# Patient Record
Sex: MALE | Race: WHITE | NOT HISPANIC OR LATINO | Employment: OTHER | URBAN - METROPOLITAN AREA
[De-identification: names, ages, dates, MRNs, and addresses within clinical notes are randomized per-mention and may not be internally consistent; named-entity substitution may affect disease eponyms.]

---

## 2017-01-09 ENCOUNTER — OFFICE VISIT (OUTPATIENT)
Dept: CARDIOLOGY | Facility: CLINIC | Age: 67
End: 2017-01-09

## 2017-01-09 VITALS
WEIGHT: 171.6 LBS | HEIGHT: 71 IN | HEART RATE: 63 BPM | DIASTOLIC BLOOD PRESSURE: 70 MMHG | SYSTOLIC BLOOD PRESSURE: 122 MMHG | BODY MASS INDEX: 24.02 KG/M2

## 2017-01-09 DIAGNOSIS — I21.11 ST ELEVATION MYOCARDIAL INFARCTION INVOLVING RIGHT CORONARY ARTERY (HCC): Primary | ICD-10-CM

## 2017-01-09 DIAGNOSIS — I25.10 CORONARY ARTERY DISEASE INVOLVING NATIVE CORONARY ARTERY OF NATIVE HEART WITHOUT ANGINA PECTORIS: ICD-10-CM

## 2017-01-09 PROBLEM — I21.3 ST ELEVATION MYOCARDIAL INFARCTION (STEMI): Status: ACTIVE | Noted: 2017-01-09

## 2017-01-09 PROCEDURE — 93000 ELECTROCARDIOGRAM COMPLETE: CPT | Performed by: INTERNAL MEDICINE

## 2017-01-09 PROCEDURE — 99213 OFFICE O/P EST LOW 20 MIN: CPT | Performed by: INTERNAL MEDICINE

## 2017-01-09 RX ORDER — SIMVASTATIN 40 MG
40 TABLET ORAL NIGHTLY
Qty: 90 TABLET | Refills: 3 | Status: SHIPPED | OUTPATIENT
Start: 2017-01-09 | End: 2020-03-31

## 2017-01-09 NOTE — PROGRESS NOTES
Date of Office Visit: 2017  Encounter Provider: Taras Perrin MD  Place of Service: [facility] CARDIOLOGY  Patient Name: Twin Babcock  :1950  [id number]    Chief Complaint   Patient presents with   • Coronary Artery Disease   :     HPI: Twin Babcock is a 66 y.o. male   He is here for followup of his coronary disease.  He initially had an inferior STEMI, now almost 2 years ago.  He came in by helicopter from [facility].  At that time we placed a 3.5 x 23 Xience in his proximal RCA.  He has done very well since then.  His LAD and circumflex were free of disease and he had overall preserved LV function.  He does not smoke.  He does not have diabetes or hypertension.  In general, he is not having any complaints of chest discomfort or shortness of breath.  His weight is down about 10 pounds on purpose.          Past Medical History   Diagnosis Date   • Health care maintenance    • ST elevation myocardial infarction (STEMI)        Past Surgical History   Procedure Laterality Date   • Cardiac catheterization     • Coronary stent placement         Social History     Social History   • Marital status:      Spouse name: N/A   • Number of children: N/A   • Years of education: N/A     Occupational History   • Not on file.     Social History Main Topics   • Smoking status: Former Smoker   • Smokeless tobacco: Not on file      Comment: caffeine use   • Alcohol use Yes   • Drug use: No   • Sexual activity: Not on file     Other Topics Concern   • Not on file     Social History Narrative       No family history on file.    Review of Systems   Constitution: Negative for decreased appetite, fever, malaise/fatigue and weight loss.   HENT: Negative for nosebleeds.    Eyes: Negative for double vision.   Cardiovascular: Negative for chest pain, claudication, cyanosis, dyspnea on exertion, irregular heartbeat, leg swelling, near-syncope, orthopnea, palpitations, paroxysmal  "nocturnal dyspnea and syncope.   Respiratory: Negative for cough, hemoptysis and shortness of breath.    Hematologic/Lymphatic: Negative for bleeding problem.   Skin: Negative for rash.   Musculoskeletal: Negative for falls and myalgias.   Gastrointestinal: Negative for hematochezia, jaundice, melena, nausea and vomiting.   Genitourinary: Negative for hematuria.   Neurological: Negative for dizziness and seizures.   Psychiatric/Behavioral: Negative for altered mental status and memory loss.       Allergies   Allergen Reactions   • Metaxalone          Current Outpatient Prescriptions:   •  aspirin 81 MG tablet, Take  by mouth Daily., Disp: , Rfl:   •  simvastatin (ZOCOR) 40 MG tablet, Take 1 tablet by mouth Every Night., Disp: 90 tablet, Rfl: 3     Objective:     Vitals:    01/09/17 1040   BP: 122/70   Pulse: 63   Weight: 171 lb 9.6 oz (77.8 kg)   Height: 71\" (180.3 cm)     Body mass index is 23.93 kg/(m^2).    Physical Exam   Constitutional: He is oriented to person, place, and time. He appears well-developed and well-nourished.   HENT:   Head: Normocephalic.   Eyes: No scleral icterus.   Neck: No JVD present. No thyromegaly present.   Cardiovascular: Normal rate, regular rhythm and normal heart sounds.  Exam reveals no gallop and no friction rub.    No murmur heard.  Pulmonary/Chest: Effort normal and breath sounds normal. He has no wheezes. He has no rales.   Abdominal: Soft. There is no hepatosplenomegaly. There is no tenderness.   Musculoskeletal: Normal range of motion. He exhibits no edema.   Lymphadenopathy:     He has no cervical adenopathy.   Neurological: He is alert and oriented to person, place, and time.   Skin: Skin is warm and dry. No rash noted.   Psychiatric: He has a normal mood and affect.         ECG 12 Lead  Date/Time: 1/9/2017 11:02 AM  Performed by: SAMMI PATTERSON  Authorized by: SAMMI PATTERSON   Comparison: compared with previous ECG   Similar to previous ECG  Rhythm: sinus " bradycardia  Clinical impression: normal ECG             Assessment:       Diagnosis Plan   1. ST elevation myocardial infarction involving right coronary artery     2. Coronary artery disease involving native coronary artery of native heart without angina pectoris            Plan:        He is doing extremely well and is asymptomatic after a STEMI.  He is on a statin and aspirin.  He has no history of hypertension, tobacco abuse, or diabetes.  His weight is good.  His activity level is good.  If no change, we will see him in a year.      Coronary Artery Disease  Assessment  • The patient has no angina    Plan  • Lifestyle modifications discussed include adhering to a heart healthy diet, maintenance of a healthy weight, medication compliance, regular exercise and regular monitoring of cholesterol and blood pressure    Subjective - Objective  • There is a history of past MI 2/15  • There has been a previous stent procedure using RIKKI 2/15  • Current antiplatelet therapy includes aspirin 81 mg        As always, it has been a pleasure to participate in your patient's care.      Sincerely,       Taras Perrin MD

## 2018-01-15 ENCOUNTER — OFFICE VISIT (OUTPATIENT)
Dept: CARDIOLOGY | Facility: CLINIC | Age: 68
End: 2018-01-15

## 2018-01-15 VITALS
HEART RATE: 59 BPM | WEIGHT: 181.4 LBS | BODY MASS INDEX: 25.97 KG/M2 | SYSTOLIC BLOOD PRESSURE: 140 MMHG | HEIGHT: 70 IN | DIASTOLIC BLOOD PRESSURE: 78 MMHG

## 2018-01-15 DIAGNOSIS — R25.1 TREMOR OF RIGHT HAND: ICD-10-CM

## 2018-01-15 DIAGNOSIS — R03.0 TRANSIENT ELEVATED BLOOD PRESSURE: ICD-10-CM

## 2018-01-15 DIAGNOSIS — I21.11 ST ELEVATION MYOCARDIAL INFARCTION INVOLVING RIGHT CORONARY ARTERY (HCC): ICD-10-CM

## 2018-01-15 DIAGNOSIS — E78.00 HYPERCHOLESTEROLEMIA: ICD-10-CM

## 2018-01-15 DIAGNOSIS — I25.10 CORONARY ARTERY DISEASE INVOLVING NATIVE CORONARY ARTERY OF NATIVE HEART WITHOUT ANGINA PECTORIS: Primary | ICD-10-CM

## 2018-01-15 PROCEDURE — 93000 ELECTROCARDIOGRAM COMPLETE: CPT | Performed by: NURSE PRACTITIONER

## 2018-01-15 PROCEDURE — 99213 OFFICE O/P EST LOW 20 MIN: CPT | Performed by: NURSE PRACTITIONER

## 2018-01-15 NOTE — PROGRESS NOTES
Date of Office Visit: 01/15/2018  Encounter Provider: MARIANO Chinchilla  Place of Service: [facility] CARDIOLOGY  Patient Name: Twin Babcock  :1950    Chief Complaint   Patient presents with   • Coronary Artery Disease   :     HPI: Twin Babcock is a 67 y.o. male who presents today for annual cardiac follow-up.  He is a new patient to me and his previous records have been reviewed.  He has a history of coronary artery disease, inferior STEMI, and status post 3.5×23 mm Xience stent and proximal RCA.  His LAD and circumflex are free of disease and he was noted to have overall preserved LV function.  He does not have any history of diabetes or hypertension.  He remains on simvastatin for history of hypercholesterolemia, followed by his PCP.  He said he was started on fish oil on his last appointment with his family doctor.  He remains active.  He only experiences shortness of breath when he overexerts.  He denies chest pain, paroxysmal nocturnal dyspnea, orthopnea, cough, edema, palpitations, dizziness, or syncope.  He is developed in essential tremor of the right hand.    The following portion of the patient's history were reviewed and updated as appropriate: past medical history, past surgical history, past social history, past family history, allergies, current medications, and problem list.    Past Medical History:   Diagnosis Date   • Coronary artery disease    • Essential tremor     right hand    • Hypercholesteremia    • ST elevation myocardial infarction (STEMI)        Past Surgical History:   Procedure Laterality Date   • CARDIAC CATHETERIZATION     • CORONARY STENT PLACEMENT      3.5 x 23 Xience in his proximal RCA   • EYE SURGERY     • TONSILLECTOMY         Social History     Social History   • Marital status:      Spouse name: N/A   • Number of children: N/A   • Years of education: N/A     Occupational History   • Not on file.     Social History  Main Topics   • Smoking status: Former Smoker   • Smokeless tobacco: Never Used      Comment: Daily caffeine use   • Alcohol use Yes      Comment: occ   • Drug use: No   • Sexual activity: Not on file       History reviewed. No pertinent family history.    Review of Systems   Constitution: Negative for chills, diaphoresis, fever, malaise/fatigue, night sweats, weight gain and weight loss.   HENT: Positive for hearing loss. Negative for nosebleeds, sore throat and tinnitus.    Eyes: Negative for blurred vision, double vision, pain and visual disturbance.   Cardiovascular: Positive for dyspnea on exertion. Negative for chest pain, claudication, cyanosis, irregular heartbeat, leg swelling, near-syncope, orthopnea, palpitations, paroxysmal nocturnal dyspnea and syncope.   Respiratory: Negative for cough, hemoptysis, shortness of breath, snoring and wheezing.    Endocrine: Negative for cold intolerance, heat intolerance and polyuria.   Hematologic/Lymphatic: Negative for bleeding problem. Does not bruise/bleed easily.   Skin: Negative for color change, dry skin, flushing and itching.   Musculoskeletal: Positive for joint pain. Negative for falls, joint swelling, muscle cramps, muscle weakness and myalgias.   Gastrointestinal: Negative for abdominal pain, constipation, heartburn, melena, nausea and vomiting.   Genitourinary: Negative for dysuria and hematuria.   Neurological: Negative for excessive daytime sleepiness, dizziness, light-headedness, loss of balance, numbness, paresthesias, seizures and vertigo.   Psychiatric/Behavioral: Negative for altered mental status, depression, memory loss and substance abuse. The patient does not have insomnia and is not nervous/anxious.    Allergic/Immunologic: Negative for environmental allergies.       Allergies   Allergen Reactions   • Metaxalone          Current Outpatient Prescriptions:   •  aspirin 81 MG tablet, Take  by mouth Daily., Disp: , Rfl:   •  Omega-3 Fatty Acids (RA  "FISH OIL) 1000 MG capsule delayed-release, Take 2,000 Units by mouth 2 (Two) Times a Day., Disp: , Rfl: 0  •  simvastatin (ZOCOR) 40 MG tablet, Take 1 tablet by mouth Every Night., Disp: 90 tablet, Rfl: 3     Objective:     Vitals:    01/15/18 1313   BP: 140/78   Pulse: 59   Weight: 82.3 kg (181 lb 6.4 oz)   Height: 177.8 cm (70\")     Body mass index is 26.03 kg/(m^2).    PHYSICAL EXAM:    Vitals Reviewed.   General Appearance: No acute distress, well developed and well nourished.   Eyes: Conjunctiva and lids: No erythema, swelling, or discharge. Sclera non-icteric.   HENT: Atraumatic, normocephalic. External eyes, ears, and nose normal. No hearing loss noted. Mucous membranes normal. Lips not cyanotic. Neck supple with no tenderness.  Respiratory: No signs of respiratory distress. Respiration rhythm and depth normal.   Clear to auscultation. No rales, crackles, rhonchi, or wheezing auscultated.   Cardiovascular:  Jugular Venous Pressure: Normal  Heart Rate and Rhythm: Normal, Heart Sounds: Normal S1 and S2. No S3 or S4 noted.  Murmurs: No murmurs noted. No rubs, thrills, or gallops.   Arterial Pulses: Carotid pulses normal. No carotid bruit noted. Posterior tibialis and dorsalis pedis pulses normal.   Lower Extremities: No edema noted.  Gastrointestinal:  Abdomen soft, non-distended, non-tender. Normal bowel sounds. No hepatomegaly.   Musculoskeletal: Normal movement of extremities  Skin and Nails: General appearance normal. No pallor, cyanosis, diaphoresis. Skin temperature normal. No clubbing of fingernails.   Psychiatric: Patient alert and oriented to person, place, and time. Speech and behavior appropriate. Normal mood and affect.       ECG 12 Lead  Date/Time: 1/15/2018 1:22 PM  Performed by: KAYLYNN MILNER  Authorized by: KAYLYNN MILNER   Comparison: compared with previous ECG from 1/9/2017  Similar to previous ECG  Rhythm: sinus bradycardia  Rate: bradycardic  BPM: 59  Conduction: conduction normal  ST " Segments: ST segments normal  T Waves: T waves normal  QRS axis: normal  Other: no other findings  Clinical impression: normal ECG              Assessment:       Diagnosis Plan   1. Coronary artery disease involving native coronary artery of native heart without angina pectoris     2. ST elevation myocardial infarction involving right coronary artery     3. Hypercholesterolemia     4. Transient elevated blood pressure     5. Tremor of right hand            Plan:       1. Coronary Artery Disease  Assessment  • The patient has no angina    Plan  • Lifestyle modifications discussed include adhering to a heart healthy diet, avoidance of tobacco products, maintenance of a healthy weight, medication compliance, regular exercise and regular monitoring of cholesterol and blood pressure    Subjective - Objective  • There is a history of past MI  • There has been a previous stent procedure using RIKKI  • Current antiplatelet therapy includes aspirin 81 mg      2. Hypercholesterolemia: This is managed by his PCP.  LDL goal is less than 70.  He was placed on fish oil by his PCP.  I told him that Dr. Perrin typically does not believe and the benefits of fish oil. When he receives his next lipid panel results, of asked him to call our office with the results.     3.  Transient Elevated Blood Pressure: His blood pressure is borderline elevated today.  He states his blood pressures typically well controlled.    4.  Right Hand Tremor: He was diagnosed with essential tremor of the right hand that occurs mostly when eating.    5.  Follow Up: He will call with any concerning cardiac symptoms and follow-up with Dr. Perrin in one year.    As always, it has been a pleasure to participate in your patient's care.      Sincerely,         MARIANO Barrera

## 2018-01-31 ENCOUNTER — OFFICE VISIT CONVERTED (OUTPATIENT)
Dept: FAMILY MEDICINE CLINIC | Facility: CLINIC | Age: 68
End: 2018-01-31
Attending: NURSE PRACTITIONER

## 2018-01-31 ENCOUNTER — CONVERSION ENCOUNTER (OUTPATIENT)
Dept: FAMILY MEDICINE CLINIC | Facility: CLINIC | Age: 68
End: 2018-01-31

## 2018-02-26 ENCOUNTER — OFFICE VISIT CONVERTED (OUTPATIENT)
Dept: FAMILY MEDICINE CLINIC | Facility: CLINIC | Age: 68
End: 2018-02-26
Attending: NURSE PRACTITIONER

## 2018-03-02 ENCOUNTER — OFFICE VISIT CONVERTED (OUTPATIENT)
Dept: ORTHOPEDIC SURGERY | Facility: CLINIC | Age: 68
End: 2018-03-02
Attending: ORTHOPAEDIC SURGERY

## 2018-03-15 ENCOUNTER — OFFICE VISIT CONVERTED (OUTPATIENT)
Dept: FAMILY MEDICINE CLINIC | Facility: CLINIC | Age: 68
End: 2018-03-15
Attending: NURSE PRACTITIONER

## 2018-04-13 ENCOUNTER — OFFICE VISIT CONVERTED (OUTPATIENT)
Dept: ORTHOPEDIC SURGERY | Facility: CLINIC | Age: 68
End: 2018-04-13
Attending: ORTHOPAEDIC SURGERY

## 2018-04-13 ENCOUNTER — TELEPHONE (OUTPATIENT)
Dept: CARDIOLOGY | Facility: CLINIC | Age: 68
End: 2018-04-13

## 2018-04-13 NOTE — TELEPHONE ENCOUNTER
Dr. Brittani Koch's office calling wanting to know if its ok to do a MRI of Rt. Shoulder on Mr. Babcock    Sarah [phone]

## 2018-05-04 ENCOUNTER — OFFICE VISIT CONVERTED (OUTPATIENT)
Dept: ORTHOPEDIC SURGERY | Facility: CLINIC | Age: 68
End: 2018-05-04
Attending: ORTHOPAEDIC SURGERY

## 2018-07-19 ENCOUNTER — OFFICE VISIT CONVERTED (OUTPATIENT)
Dept: FAMILY MEDICINE CLINIC | Facility: CLINIC | Age: 68
End: 2018-07-19
Attending: NURSE PRACTITIONER

## 2018-08-09 ENCOUNTER — OFFICE VISIT CONVERTED (OUTPATIENT)
Dept: ORTHOPEDIC SURGERY | Facility: CLINIC | Age: 68
End: 2018-08-09
Attending: ORTHOPAEDIC SURGERY

## 2018-12-07 ENCOUNTER — OFFICE VISIT CONVERTED (OUTPATIENT)
Dept: FAMILY MEDICINE CLINIC | Facility: CLINIC | Age: 68
End: 2018-12-07
Attending: NURSE PRACTITIONER

## 2018-12-07 ENCOUNTER — CONVERSION ENCOUNTER (OUTPATIENT)
Dept: FAMILY MEDICINE CLINIC | Facility: CLINIC | Age: 68
End: 2018-12-07

## 2019-02-07 ENCOUNTER — OFFICE VISIT (OUTPATIENT)
Dept: CARDIOLOGY | Facility: CLINIC | Age: 69
End: 2019-02-07

## 2019-02-07 VITALS
DIASTOLIC BLOOD PRESSURE: 64 MMHG | SYSTOLIC BLOOD PRESSURE: 114 MMHG | HEART RATE: 60 BPM | BODY MASS INDEX: 25.77 KG/M2 | WEIGHT: 180 LBS | HEIGHT: 70 IN

## 2019-02-07 DIAGNOSIS — I25.10 CORONARY ARTERY DISEASE INVOLVING NATIVE CORONARY ARTERY OF NATIVE HEART WITHOUT ANGINA PECTORIS: Primary | ICD-10-CM

## 2019-02-07 PROCEDURE — 99213 OFFICE O/P EST LOW 20 MIN: CPT | Performed by: NURSE PRACTITIONER

## 2019-02-07 PROCEDURE — 93000 ELECTROCARDIOGRAM COMPLETE: CPT | Performed by: NURSE PRACTITIONER

## 2019-02-07 NOTE — PROGRESS NOTES
Date of Office Visit: 2019  Encounter Provider: MARIANO Mclaughlin  Place of Service: [facility] CARDIOLOGY  Patient Name: Twin Babcock  :1950    Chief Complaint   Patient presents with   • Coronary Artery Disease     1 YEAR F/U   • STEMI   :     HPI: Twin Babcock is a 68 y.o. male, new to me, who presents today for follow-up.  Old records have been obtained and reviewed by me.  He is a patient of Dr. Tovar with a past cardiac history significant for coronary artery disease and hypercholesteremia.  In , he had an inferior STEMI with a stent to the proximal RCA.  His LAD and circumflex were free of disease and he was noted to have overall preserved LV function.  He is on simvastatin for a history of hypercholesterolemia which is managed by his PCP.  He does not have a history of hypertension or diabetes and he has never been a smoker.  He was last seen in the office on 1/15/2018 and at that time he was doing well with no complaints of angina or heart failure.   Overall he is doing well from a cardiac standpoint.  He denies chest pain, shortness of breath, edema, lightheadedness/dizziness, syncope, PND, orthopnea, and bleeding issues.  His activity consists of cutting and splitting wood in his yard and he is able to do so without any difficulty.  He enjoys taking hikes in the summer.  In the winter months he does have access to a treadmill at home but it doesn't sound like he has gotten much use out of it recently.        Past Medical History:   Diagnosis Date   • Coronary artery disease    • Essential tremor     right hand    • Hypercholesteremia    • ST elevation myocardial infarction (STEMI) (CMS/HCC)        Past Surgical History:   Procedure Laterality Date   • CARDIAC CATHETERIZATION     • CORONARY STENT PLACEMENT      3.5 x 23 Xience in his proximal RCA   • EYE SURGERY     • TONSILLECTOMY         Social History     Socioeconomic History   •  Marital status:      Spouse name: Not on file   • Number of children: Not on file   • Years of education: Not on file   • Highest education level: Not on file   Social Needs   • Financial resource strain: Not on file   • Food insecurity - worry: Not on file   • Food insecurity - inability: Not on file   • Transportation needs - medical: Not on file   • Transportation needs - non-medical: Not on file   Occupational History   • Not on file   Tobacco Use   • Smoking status: Former Smoker   • Smokeless tobacco: Never Used   • Tobacco comment: Daily caffeine use   Substance and Sexual Activity   • Alcohol use: Yes     Comment: occ   • Drug use: No   • Sexual activity: Not on file   Other Topics Concern   • Not on file   Social History Narrative   • Not on file       History reviewed. No pertinent family history.    Review of Systems   Constitution: Negative for chills, fever and malaise/fatigue.   Cardiovascular: Negative for chest pain, dyspnea on exertion, leg swelling, near-syncope, orthopnea, palpitations, paroxysmal nocturnal dyspnea and syncope.   Respiratory: Negative for cough and shortness of breath.    Musculoskeletal: Negative for joint pain, joint swelling and myalgias.   Gastrointestinal: Negative for abdominal pain, diarrhea, melena, nausea and vomiting.   Genitourinary: Negative for frequency and hematuria.   Neurological: Negative for light-headedness, numbness, paresthesias and seizures.   Allergic/Immunologic: Negative.    All other systems reviewed and are negative.      Allergies   Allergen Reactions   • Metaxalone          Current Outpatient Medications:   •  aspirin 81 MG tablet, Take  by mouth Daily., Disp: , Rfl:   •  Omega-3 Fatty Acids (RA FISH OIL) 1000 MG capsule delayed-release, Take 2,000 Units by mouth 2 (Two) Times a Day., Disp: , Rfl: 0  •  simvastatin (ZOCOR) 40 MG tablet, Take 1 tablet by mouth Every Night., Disp: 90 tablet, Rfl: 3      Objective:     Vitals:    02/07/19 1428  "  BP: 114/64   Pulse: 60   Weight: 81.6 kg (180 lb)   Height: 177.8 cm (70\")     Body mass index is 25.83 kg/m².    PHYSICAL EXAM:    Physical Exam   Constitutional: He is oriented to person, place, and time. He appears well-developed and well-nourished. No distress.   HENT:   Head: Normocephalic and atraumatic.   Eyes: Pupils are equal, round, and reactive to light.   Neck: No JVD present. No thyromegaly present.   Cardiovascular: Normal rate, regular rhythm, normal heart sounds and intact distal pulses.   No murmur heard.  Pulmonary/Chest: Effort normal and breath sounds normal. No respiratory distress.   Abdominal: Soft. Bowel sounds are normal. He exhibits no distension. There is no splenomegaly or hepatomegaly. There is no tenderness.   Musculoskeletal: Normal range of motion. He exhibits no edema.   Neurological: He is alert and oriented to person, place, and time.   Skin: Skin is warm and dry. He is not diaphoretic. No erythema.   Psychiatric: He has a normal mood and affect. His behavior is normal. Judgment normal.         ECG 12 Lead  Date/Time: 2/7/2019 2:36 PM  Performed by: Natasha Adhikari APRN  Authorized by: Natasha Adhikari APRN   Comparison: compared with previous ECG from 1/15/2018  Similar to previous ECG  Rhythm: sinus rhythm  Rate: normal  BPM: 60  Clinical impression: normal ECG  Comments: Indication: CAD              Assessment:       Diagnosis Plan   1. Coronary artery disease involving native coronary artery of native heart without angina pectoris  ECG 12 Lead     Orders Placed This Encounter   Procedures   • ECG 12 Lead     This order was created via procedure documentation          Plan:       Coronary Artery Disease  Assessment  • The patient has no angina    Plan  • Lifestyle modifications discussed include adhering to a heart healthy diet, maintenance of a healthy weight, medication compliance, regular exercise and regular monitoring of cholesterol and blood " pressure    Subjective - Objective  • There has been a previous stent procedure using RIKKI  • Current antiplatelet therapy includes aspirin 81 mg  • Overall he is doing well from a cardiac standpoint.  He denies symptoms of angina or heart failure.  He still works in his yard splitting wood and is able to do so without any difficulty and he intends on getting back on the treadmill soon.  Additionally, he states he is able to climb stairs without any chest pain or shortness of breath.      2. Hypercholesteremia.  I will defer this to his PCP.  The last time he had a lipid panel was in July of 2018 which was stable.    Overall I think he is doing well from a cardiac standpoint.  I encouraged him to follow-up soon with his PCP regarding his lipids.  He said he would do so within the next week.  I told him it was okay to take Propanolol for tremors, which was given to him by his PCP.  I'm not going to make any changes to his medical regimen and he will follow up in 1 year with Dr. Perrin or sooner if needed.      As always, it has been a pleasure to participate in your patient's care.      Sincerely,         MARIANO Ayala

## 2019-02-15 ENCOUNTER — HOSPITAL ENCOUNTER (OUTPATIENT)
Dept: FAMILY MEDICINE CLINIC | Facility: CLINIC | Age: 69
Discharge: HOME | End: 2019-02-15

## 2019-02-15 ENCOUNTER — OFFICE VISIT CONVERTED (OUTPATIENT)
Dept: FAMILY MEDICINE CLINIC | Facility: CLINIC | Age: 69
End: 2019-02-15
Attending: FAMILY MEDICINE

## 2019-02-15 LAB
ALBUMIN SERPL-MCNC: 4.2 G/DL (ref 3.5–5)
ALBUMIN/GLOB SERPL: 1.6 {RATIO} (ref 1.4–2.6)
ALP SERPL-CCNC: 86 U/L (ref 56–155)
ALT SERPL-CCNC: 39 U/L (ref 10–40)
ANION GAP SERPL CALC-SCNC: 19 MMOL/L (ref 8–19)
AST SERPL-CCNC: 23 U/L (ref 15–50)
BILIRUB SERPL-MCNC: 0.52 MG/DL (ref 0.2–1.3)
BUN SERPL-MCNC: 16 MG/DL (ref 5–25)
BUN/CREAT SERPL: 18 {RATIO} (ref 6–20)
CALCIUM SERPL-MCNC: 9.8 MG/DL (ref 8.7–10.4)
CHLORIDE SERPL-SCNC: 104 MMOL/L (ref 99–111)
CHOLEST SERPL-MCNC: 156 MG/DL (ref 107–200)
CHOLEST/HDLC SERPL: 4.7 {RATIO} (ref 3–6)
CONV CO2: 22 MMOL/L (ref 22–32)
CONV TOTAL PROTEIN: 6.8 G/DL (ref 6.3–8.2)
CREAT UR-MCNC: 0.89 MG/DL (ref 0.7–1.2)
GFR SERPLBLD BASED ON 1.73 SQ M-ARVRAT: >60 ML/MIN/{1.73_M2}
GLOBULIN UR ELPH-MCNC: 2.6 G/DL (ref 2–3.5)
GLUCOSE SERPL-MCNC: 112 MG/DL (ref 70–99)
HDLC SERPL-MCNC: 33 MG/DL (ref 40–60)
LDLC SERPL CALC-MCNC: 78 MG/DL (ref 70–100)
OSMOLALITY SERPL CALC.SUM OF ELEC: 294 MOSM/KG (ref 273–304)
POTASSIUM SERPL-SCNC: 3.8 MMOL/L (ref 3.5–5.3)
SODIUM SERPL-SCNC: 141 MMOL/L (ref 135–147)
TRIGL SERPL-MCNC: 224 MG/DL (ref 40–150)
VLDLC SERPL-MCNC: 45 MG/DL (ref 5–37)

## 2019-09-12 ENCOUNTER — OFFICE VISIT CONVERTED (OUTPATIENT)
Dept: FAMILY MEDICINE CLINIC | Facility: CLINIC | Age: 69
End: 2019-09-12
Attending: NURSE PRACTITIONER

## 2019-09-12 ENCOUNTER — HOSPITAL ENCOUNTER (OUTPATIENT)
Dept: FAMILY MEDICINE CLINIC | Facility: CLINIC | Age: 69
Discharge: HOME | End: 2019-09-12
Attending: NURSE PRACTITIONER

## 2019-09-12 LAB
ALBUMIN SERPL-MCNC: 4.6 G/DL (ref 3.5–5)
ALBUMIN/GLOB SERPL: 1.6 {RATIO} (ref 1.4–2.6)
ALP SERPL-CCNC: 104 U/L (ref 56–155)
ALT SERPL-CCNC: 34 U/L (ref 10–40)
ANION GAP SERPL CALC-SCNC: 25 MMOL/L (ref 8–19)
APPEARANCE UR: CLEAR
AST SERPL-CCNC: 22 U/L (ref 15–50)
BASOPHILS # BLD AUTO: 0.14 10*3/UL (ref 0–0.2)
BASOPHILS NFR BLD AUTO: 1.2 % (ref 0–3)
BILIRUB SERPL-MCNC: 0.49 MG/DL (ref 0.2–1.3)
BILIRUB UR QL: NEGATIVE
BUN SERPL-MCNC: 10 MG/DL (ref 5–25)
BUN/CREAT SERPL: 10 {RATIO} (ref 6–20)
CALCIUM SERPL-MCNC: 10.2 MG/DL (ref 8.7–10.4)
CHLORIDE SERPL-SCNC: 107 MMOL/L (ref 99–111)
CHOLEST SERPL-MCNC: 158 MG/DL (ref 107–200)
CHOLEST/HDLC SERPL: 4.6 {RATIO} (ref 3–6)
COLOR UR: YELLOW
CONV ABS IMM GRAN: 0.04 10*3/UL (ref 0–0.2)
CONV CO2: 19 MMOL/L (ref 22–32)
CONV COLLECTION SOURCE (UA): NORMAL
CONV IMMATURE GRAN: 0.4 % (ref 0–1.8)
CONV TOTAL PROTEIN: 7.4 G/DL (ref 6.3–8.2)
CONV UROBILINOGEN IN URINE BY AUTOMATED TEST STRIP: 0.2 {EHRLICHU}/DL (ref 0.1–1)
CREAT UR-MCNC: 1 MG/DL (ref 0.7–1.2)
DEPRECATED RDW RBC AUTO: 44.7 FL (ref 35.1–43.9)
EOSINOPHIL # BLD AUTO: 0.75 10*3/UL (ref 0–0.7)
EOSINOPHIL # BLD AUTO: 6.6 % (ref 0–7)
ERYTHROCYTE [DISTWIDTH] IN BLOOD BY AUTOMATED COUNT: 12.8 % (ref 11.6–14.4)
GFR SERPLBLD BASED ON 1.73 SQ M-ARVRAT: >60 ML/MIN/{1.73_M2}
GLOBULIN UR ELPH-MCNC: 2.8 G/DL (ref 2–3.5)
GLUCOSE SERPL-MCNC: 144 MG/DL (ref 70–99)
GLUCOSE UR QL: NEGATIVE MG/DL
HCT VFR BLD AUTO: 47.9 % (ref 42–52)
HDLC SERPL-MCNC: 34 MG/DL (ref 40–60)
HGB BLD-MCNC: 15.8 G/DL (ref 14–18)
HGB UR QL STRIP: NEGATIVE
KETONES UR QL STRIP: NEGATIVE MG/DL
LDLC SERPL CALC-MCNC: 73 MG/DL (ref 70–100)
LEUKOCYTE ESTERASE UR QL STRIP: NEGATIVE
LYMPHOCYTES # BLD AUTO: 3.84 10*3/UL (ref 1–5)
LYMPHOCYTES NFR BLD AUTO: 34 % (ref 20–45)
MCH RBC QN AUTO: 31.5 PG (ref 27–31)
MCHC RBC AUTO-ENTMCNC: 33 G/DL (ref 33–37)
MCV RBC AUTO: 95.4 FL (ref 80–96)
MONOCYTES # BLD AUTO: 1.04 10*3/UL (ref 0.2–1.2)
MONOCYTES NFR BLD AUTO: 9.2 % (ref 3–10)
NEUTROPHILS # BLD AUTO: 5.49 10*3/UL (ref 2–8)
NEUTROPHILS NFR BLD AUTO: 48.6 % (ref 30–85)
NITRITE UR QL STRIP: NEGATIVE
NRBC CBCN: 0 % (ref 0–0.7)
OSMOLALITY SERPL CALC.SUM OF ELEC: 304 MOSM/KG (ref 273–304)
PH UR STRIP.AUTO: 5 [PH] (ref 5–8)
PLATELET # BLD AUTO: 311 10*3/UL (ref 130–400)
PMV BLD AUTO: 9.7 FL (ref 9.4–12.4)
POTASSIUM SERPL-SCNC: 4.7 MMOL/L (ref 3.5–5.3)
PROT UR QL: NEGATIVE MG/DL
PSA SERPL-MCNC: 4.1 NG/ML (ref 0–4)
RBC # BLD AUTO: 5.02 10*6/UL (ref 4.7–6.1)
SODIUM SERPL-SCNC: 146 MMOL/L (ref 135–147)
SP GR UR: 1.02 (ref 1–1.03)
TRIGL SERPL-MCNC: 254 MG/DL (ref 40–150)
VLDLC SERPL-MCNC: 51 MG/DL (ref 5–37)
WBC # BLD AUTO: 11.3 10*3/UL (ref 4.8–10.8)

## 2019-09-30 ENCOUNTER — HOSPITAL ENCOUNTER (OUTPATIENT)
Dept: FAMILY MEDICINE CLINIC | Facility: CLINIC | Age: 69
Discharge: HOME | End: 2019-09-30
Attending: NURSE PRACTITIONER

## 2019-09-30 LAB
EST. AVERAGE GLUCOSE BLD GHB EST-MCNC: 126 MG/DL
HBA1C MFR BLD: 6 % (ref 3.5–5.7)

## 2019-11-06 ENCOUNTER — OFFICE VISIT CONVERTED (OUTPATIENT)
Dept: UROLOGY | Facility: CLINIC | Age: 69
End: 2019-11-06
Attending: UROLOGY

## 2020-01-15 ENCOUNTER — HOSPITAL ENCOUNTER (OUTPATIENT)
Dept: FAMILY MEDICINE CLINIC | Facility: CLINIC | Age: 70
Discharge: HOME | End: 2020-01-15
Attending: NURSE PRACTITIONER

## 2020-01-15 ENCOUNTER — OFFICE VISIT CONVERTED (OUTPATIENT)
Dept: FAMILY MEDICINE CLINIC | Facility: CLINIC | Age: 70
End: 2020-01-15
Attending: NURSE PRACTITIONER

## 2020-01-15 LAB
ALBUMIN SERPL-MCNC: 4.3 G/DL (ref 3.5–5)
ALBUMIN/GLOB SERPL: 1.7 {RATIO} (ref 1.4–2.6)
ALP SERPL-CCNC: 80 U/L (ref 56–155)
ALT SERPL-CCNC: 42 U/L (ref 10–40)
ANION GAP SERPL CALC-SCNC: 22 MMOL/L (ref 8–19)
AST SERPL-CCNC: 23 U/L (ref 15–50)
BASOPHILS # BLD AUTO: 0.12 10*3/UL (ref 0–0.2)
BASOPHILS NFR BLD AUTO: 1.2 % (ref 0–3)
BILIRUB SERPL-MCNC: 0.57 MG/DL (ref 0.2–1.3)
BUN SERPL-MCNC: 13 MG/DL (ref 5–25)
BUN/CREAT SERPL: 15 {RATIO} (ref 6–20)
CALCIUM SERPL-MCNC: 10.3 MG/DL (ref 8.7–10.4)
CHLORIDE SERPL-SCNC: 105 MMOL/L (ref 99–111)
CHOLEST SERPL-MCNC: 161 MG/DL (ref 107–200)
CHOLEST/HDLC SERPL: 4.7 {RATIO} (ref 3–6)
CONV ABS IMM GRAN: 0.03 10*3/UL (ref 0–0.2)
CONV CO2: 21 MMOL/L (ref 22–32)
CONV IMMATURE GRAN: 0.3 % (ref 0–1.8)
CONV TOTAL PROTEIN: 6.8 G/DL (ref 6.3–8.2)
CREAT UR-MCNC: 0.89 MG/DL (ref 0.7–1.2)
DEPRECATED RDW RBC AUTO: 43.1 FL (ref 35.1–43.9)
EOSINOPHIL # BLD AUTO: 0.53 10*3/UL (ref 0–0.7)
EOSINOPHIL # BLD AUTO: 5.4 % (ref 0–7)
ERYTHROCYTE [DISTWIDTH] IN BLOOD BY AUTOMATED COUNT: 12.8 % (ref 11.6–14.4)
GFR SERPLBLD BASED ON 1.73 SQ M-ARVRAT: >60 ML/MIN/{1.73_M2}
GLOBULIN UR ELPH-MCNC: 2.5 G/DL (ref 2–3.5)
GLUCOSE SERPL-MCNC: 131 MG/DL (ref 70–99)
HCT VFR BLD AUTO: 48 % (ref 42–52)
HDLC SERPL-MCNC: 34 MG/DL (ref 40–60)
HGB BLD-MCNC: 16.5 G/DL (ref 14–18)
LDLC SERPL CALC-MCNC: 86 MG/DL (ref 70–100)
LYMPHOCYTES # BLD AUTO: 4.15 10*3/UL (ref 1–5)
LYMPHOCYTES NFR BLD AUTO: 42.6 % (ref 20–45)
MCH RBC QN AUTO: 31.6 PG (ref 27–31)
MCHC RBC AUTO-ENTMCNC: 34.4 G/DL (ref 33–37)
MCV RBC AUTO: 92 FL (ref 80–96)
MONOCYTES # BLD AUTO: 0.77 10*3/UL (ref 0.2–1.2)
MONOCYTES NFR BLD AUTO: 7.9 % (ref 3–10)
NEUTROPHILS # BLD AUTO: 4.15 10*3/UL (ref 2–8)
NEUTROPHILS NFR BLD AUTO: 42.6 % (ref 30–85)
NRBC CBCN: 0 % (ref 0–0.7)
OSMOLALITY SERPL CALC.SUM OF ELEC: 298 MOSM/KG (ref 273–304)
PLATELET # BLD AUTO: 284 10*3/UL (ref 130–400)
PMV BLD AUTO: 9.5 FL (ref 9.4–12.4)
POTASSIUM SERPL-SCNC: 4.6 MMOL/L (ref 3.5–5.3)
PSA SERPL-MCNC: 4.73 NG/ML (ref 0–4)
RBC # BLD AUTO: 5.22 10*6/UL (ref 4.7–6.1)
SODIUM SERPL-SCNC: 143 MMOL/L (ref 135–147)
TRIGL SERPL-MCNC: 203 MG/DL (ref 40–150)
VLDLC SERPL-MCNC: 41 MG/DL (ref 5–37)
WBC # BLD AUTO: 9.75 10*3/UL (ref 4.8–10.8)

## 2020-02-07 ENCOUNTER — OFFICE VISIT CONVERTED (OUTPATIENT)
Dept: UROLOGY | Facility: CLINIC | Age: 70
End: 2020-02-07
Attending: UROLOGY

## 2020-03-11 ENCOUNTER — HOSPITAL ENCOUNTER (OUTPATIENT)
Dept: FAMILY MEDICINE CLINIC | Facility: CLINIC | Age: 70
Discharge: HOME | End: 2020-03-11
Attending: NURSE PRACTITIONER

## 2020-03-11 LAB
EST. AVERAGE GLUCOSE BLD GHB EST-MCNC: 131 MG/DL
HBA1C MFR BLD: 6.2 % (ref 3.5–5.7)

## 2020-03-31 ENCOUNTER — OFFICE VISIT (OUTPATIENT)
Dept: CARDIOLOGY | Facility: CLINIC | Age: 70
End: 2020-03-31

## 2020-03-31 VITALS — BODY MASS INDEX: 25.34 KG/M2 | WEIGHT: 177 LBS | HEIGHT: 70 IN

## 2020-03-31 DIAGNOSIS — I21.11 ST ELEVATION MYOCARDIAL INFARCTION INVOLVING RIGHT CORONARY ARTERY (HCC): Primary | ICD-10-CM

## 2020-03-31 DIAGNOSIS — E78.00 HYPERCHOLESTEROLEMIA: ICD-10-CM

## 2020-03-31 PROCEDURE — 99212 OFFICE O/P EST SF 10 MIN: CPT | Performed by: INTERNAL MEDICINE

## 2020-03-31 RX ORDER — BACLOFEN 10 MG/1
10 TABLET ORAL 3 TIMES DAILY
COMMUNITY
End: 2021-03-31

## 2020-03-31 RX ORDER — PRAVASTATIN SODIUM 40 MG
40 TABLET ORAL DAILY
COMMUNITY
End: 2020-03-31

## 2020-03-31 RX ORDER — MELOXICAM 15 MG/1
15 TABLET ORAL DAILY
COMMUNITY
End: 2021-03-31

## 2020-03-31 RX ORDER — ATORVASTATIN CALCIUM 40 MG/1
40 TABLET, FILM COATED ORAL DAILY
Qty: 90 TABLET | Refills: 3 | Status: SHIPPED | OUTPATIENT
Start: 2020-03-31 | End: 2021-04-05

## 2020-03-31 NOTE — PROGRESS NOTES
He is a 70-year-old jabier that had an inferior STEMI in 2015 at that time with acute 5.3 Xience in his proximal RCA his other coronaries were free of disease and he had normal LV function.  He does not smoke does not have diabetes does not have hypertension.  We are doing a telemedicine visit with him through the coronavirus.    In general he is doing well he lives in [facility] there have not been any cases of Carley 19 from there yet.  He also score 6 risk of low blood limitation.  He is not having chest pain shortness of breath no PND orthopnea edema he is working mainly from home.    He seems to be getting along well I did want to make one change today that I think is significant and that is ominous stop his probable and switch him to Lipitor or atorvastatin the data would say that he should be on a large dose of a high potency statin regardless of what his lipids are and would not obtain that.  I will see him back in 2 years with him come see Natasha in a year.  I    I did give him general coronavirus recommendations of sizable social isolation and distancing and keeping his hands clean.    This visit lasted 7 minutes on the phone plus another 7 minutes of charting. This was an audio and video enabled telemedicine encounter.

## 2020-06-03 ENCOUNTER — HOSPITAL ENCOUNTER (OUTPATIENT)
Dept: FAMILY MEDICINE CLINIC | Facility: CLINIC | Age: 70
Discharge: HOME | End: 2020-06-03
Attending: NURSE PRACTITIONER

## 2020-06-03 ENCOUNTER — OFFICE VISIT CONVERTED (OUTPATIENT)
Dept: FAMILY MEDICINE CLINIC | Facility: CLINIC | Age: 70
End: 2020-06-03
Attending: NURSE PRACTITIONER

## 2020-06-04 LAB — PSA SERPL-MCNC: 3.26 NG/ML (ref 0–4)

## 2020-06-09 ENCOUNTER — TELEPHONE CONVERTED (OUTPATIENT)
Dept: UROLOGY | Facility: CLINIC | Age: 70
End: 2020-06-09
Attending: UROLOGY

## 2020-06-16 ENCOUNTER — OFFICE VISIT CONVERTED (OUTPATIENT)
Dept: ORTHOPEDIC SURGERY | Facility: CLINIC | Age: 70
End: 2020-06-16
Attending: ORTHOPAEDIC SURGERY

## 2020-06-16 ENCOUNTER — CONVERSION ENCOUNTER (OUTPATIENT)
Dept: ORTHOPEDIC SURGERY | Facility: CLINIC | Age: 70
End: 2020-06-16

## 2020-07-14 ENCOUNTER — HOSPITAL ENCOUNTER (OUTPATIENT)
Dept: URGENT CARE | Facility: CLINIC | Age: 70
Discharge: HOME | End: 2020-07-14

## 2020-10-02 ENCOUNTER — OFFICE VISIT CONVERTED (OUTPATIENT)
Dept: FAMILY MEDICINE CLINIC | Facility: CLINIC | Age: 70
End: 2020-10-02
Attending: NURSE PRACTITIONER

## 2020-10-02 ENCOUNTER — HOSPITAL ENCOUNTER (OUTPATIENT)
Dept: FAMILY MEDICINE CLINIC | Facility: CLINIC | Age: 70
Discharge: HOME | End: 2020-10-02
Attending: NURSE PRACTITIONER

## 2020-10-14 ENCOUNTER — HOSPITAL ENCOUNTER (OUTPATIENT)
Dept: FAMILY MEDICINE CLINIC | Facility: CLINIC | Age: 70
Discharge: HOME | End: 2020-10-14
Attending: NURSE PRACTITIONER

## 2020-10-14 LAB
ALBUMIN SERPL-MCNC: 4.4 G/DL (ref 3.5–5)
ALBUMIN/GLOB SERPL: 1.8 {RATIO} (ref 1.4–2.6)
ALP SERPL-CCNC: 133 U/L (ref 56–155)
ALT SERPL-CCNC: 34 U/L (ref 10–40)
ANION GAP SERPL CALC-SCNC: 19 MMOL/L (ref 8–19)
APPEARANCE UR: CLEAR
AST SERPL-CCNC: 19 U/L (ref 15–50)
BASOPHILS # BLD AUTO: 0.14 10*3/UL (ref 0–0.2)
BASOPHILS NFR BLD AUTO: 1.5 % (ref 0–3)
BILIRUB SERPL-MCNC: 0.47 MG/DL (ref 0.2–1.3)
BILIRUB UR QL: NEGATIVE
BUN SERPL-MCNC: 11 MG/DL (ref 5–25)
BUN/CREAT SERPL: 11 {RATIO} (ref 6–20)
CALCIUM SERPL-MCNC: 10.1 MG/DL (ref 8.7–10.4)
CHLORIDE SERPL-SCNC: 103 MMOL/L (ref 99–111)
CHOLEST SERPL-MCNC: 143 MG/DL (ref 107–200)
CHOLEST/HDLC SERPL: 4 {RATIO} (ref 3–6)
COLOR UR: YELLOW
CONV ABS IMM GRAN: 0.03 10*3/UL (ref 0–0.2)
CONV BACTERIA: ABNORMAL
CONV CO2: 22 MMOL/L (ref 22–32)
CONV COLLECTION SOURCE (UA): ABNORMAL
CONV HYALINE CASTS IN URINE MICRO: ABNORMAL /[LPF]
CONV IMMATURE GRAN: 0.3 % (ref 0–1.8)
CONV TOTAL PROTEIN: 6.9 G/DL (ref 6.3–8.2)
CONV UROBILINOGEN IN URINE BY AUTOMATED TEST STRIP: 0.2 {EHRLICHU}/DL (ref 0.1–1)
CREAT UR-MCNC: 0.98 MG/DL (ref 0.7–1.2)
DEPRECATED RDW RBC AUTO: 43.4 FL (ref 35.1–43.9)
EOSINOPHIL # BLD AUTO: 0.33 10*3/UL (ref 0–0.7)
EOSINOPHIL # BLD AUTO: 3.6 % (ref 0–7)
ERYTHROCYTE [DISTWIDTH] IN BLOOD BY AUTOMATED COUNT: 12.7 % (ref 11.6–14.4)
GFR SERPLBLD BASED ON 1.73 SQ M-ARVRAT: >60 ML/MIN/{1.73_M2}
GLOBULIN UR ELPH-MCNC: 2.5 G/DL (ref 2–3.5)
GLUCOSE SERPL-MCNC: 208 MG/DL (ref 70–99)
GLUCOSE UR QL: 500 MG/DL
HCT VFR BLD AUTO: 48.4 % (ref 42–52)
HDLC SERPL-MCNC: 36 MG/DL (ref 40–60)
HGB BLD-MCNC: 16 G/DL (ref 14–18)
HGB UR QL STRIP: NEGATIVE
KETONES UR QL STRIP: NEGATIVE MG/DL
LDLC SERPL CALC-MCNC: 63 MG/DL (ref 70–100)
LEUKOCYTE ESTERASE UR QL STRIP: ABNORMAL
LYMPHOCYTES # BLD AUTO: 4.13 10*3/UL (ref 1–5)
LYMPHOCYTES NFR BLD AUTO: 45 % (ref 20–45)
MCH RBC QN AUTO: 30.7 PG (ref 27–31)
MCHC RBC AUTO-ENTMCNC: 33.1 G/DL (ref 33–37)
MCV RBC AUTO: 92.7 FL (ref 80–96)
MONOCYTES # BLD AUTO: 0.85 10*3/UL (ref 0.2–1.2)
MONOCYTES NFR BLD AUTO: 9.3 % (ref 3–10)
NEUTROPHILS # BLD AUTO: 3.7 10*3/UL (ref 2–8)
NEUTROPHILS NFR BLD AUTO: 40.3 % (ref 30–85)
NITRITE UR QL STRIP: NEGATIVE
NRBC CBCN: 0 % (ref 0–0.7)
OSMOLALITY SERPL CALC.SUM OF ELEC: 293 MOSM/KG (ref 273–304)
PH UR STRIP.AUTO: 5 [PH] (ref 5–8)
PLATELET # BLD AUTO: 302 10*3/UL (ref 130–400)
PMV BLD AUTO: 9.9 FL (ref 9.4–12.4)
POTASSIUM SERPL-SCNC: 4.5 MMOL/L (ref 3.5–5.3)
PROT UR QL: NEGATIVE MG/DL
RBC # BLD AUTO: 5.22 10*6/UL (ref 4.7–6.1)
RBC #/AREA URNS HPF: ABNORMAL /[HPF]
SODIUM SERPL-SCNC: 139 MMOL/L (ref 135–147)
SP GR UR: 1.02 (ref 1–1.03)
TRIGL SERPL-MCNC: 219 MG/DL (ref 40–150)
TSH SERPL-ACNC: 2.64 M[IU]/L (ref 0.27–4.2)
VLDLC SERPL-MCNC: 44 MG/DL (ref 5–37)
WBC # BLD AUTO: 9.18 10*3/UL (ref 4.8–10.8)
WBC #/AREA URNS HPF: ABNORMAL /[HPF]

## 2020-10-17 LAB
AMPICILLIN SUSC ISLT: <=2
BACTERIA UR CULT: ABNORMAL
CIPROFLOXACIN SUSC ISLT: 1
CONV GENTAMICIN HIGH LEVEL SYNERGY: ABNORMAL
CONV STREPTOMYCIN HIGH LEVEL SYNERGY: ABNORMAL
DAPTOMYCIN SUSC ISLT: 4
DOXYCYCLINE SUSC ISLT: <=0.5
ERYTHROMYCIN SUSC ISLT: 2
LEVOFLOXACIN SUSC ISLT: 2
LINEZOLID SUSC ISLT: 2
NITROFURANTOIN SUSC ISLT: <=16
TETRACYCLINE SUSC ISLT: <=1
VANCOMYCIN SUSC ISLT: 1

## 2021-03-31 ENCOUNTER — OFFICE VISIT (OUTPATIENT)
Dept: CARDIOLOGY | Facility: CLINIC | Age: 71
End: 2021-03-31

## 2021-03-31 VITALS
BODY MASS INDEX: 24.34 KG/M2 | SYSTOLIC BLOOD PRESSURE: 128 MMHG | DIASTOLIC BLOOD PRESSURE: 80 MMHG | WEIGHT: 170 LBS | HEIGHT: 70 IN | OXYGEN SATURATION: 98 % | HEART RATE: 54 BPM

## 2021-03-31 DIAGNOSIS — I25.10 CORONARY ARTERY DISEASE INVOLVING NATIVE CORONARY ARTERY OF NATIVE HEART WITHOUT ANGINA PECTORIS: Primary | ICD-10-CM

## 2021-03-31 DIAGNOSIS — E78.00 HYPERCHOLESTEROLEMIA: ICD-10-CM

## 2021-03-31 PROCEDURE — 93000 ELECTROCARDIOGRAM COMPLETE: CPT | Performed by: NURSE PRACTITIONER

## 2021-03-31 PROCEDURE — 99214 OFFICE O/P EST MOD 30 MIN: CPT | Performed by: NURSE PRACTITIONER

## 2021-03-31 RX ORDER — DICLOFENAC SODIUM 75 MG/1
1 TABLET, DELAYED RELEASE ORAL DAILY
COMMUNITY
Start: 2021-03-27 | End: 2021-07-01

## 2021-03-31 RX ORDER — PROPRANOLOL HYDROCHLORIDE 20 MG/1
20 TABLET ORAL 2 TIMES DAILY
COMMUNITY
Start: 2021-02-28 | End: 2021-09-27

## 2021-03-31 NOTE — PROGRESS NOTES
Date of Office Visit: 2021  Encounter Provider: MARIANO Mclaughlin  Place of Service: [facility] CARDIOLOGY  Patient Name: Twin Babcock  :1950    Chief Complaint   Patient presents with   • Coronary Artery Disease   • Hypertension   • stemi   • Hyperlipidemia   :     HPI: Twin Babcock is a 71 y.o. male who presents today for follow-up.  Old records have been obtained and reviewed by me.  He is a patient of Dr. Perrin's with a past cardiac history significant for coronary artery disease and hypercholesteremia.  In , he had an inferior STEMI with a stent to the proximal RCA.  His LAD and circumflex were free of disease and he was noted to have overall preserved LV function.   He does not have a history of hypertension or diabetes and he has never been a smoker.    In 2020, he had a telehealth call with Dr. Perrin at which time he was doing well with no complaints of angina or heart failure.  He was switched to Lipitor.  Otherwise, he was advised to follow-up in 1 year.   Overall, he is feeling well.  He denies any chest pain, shortness of breath, palpitations, edema, or syncope.  About a month ago, he had a 2-week.  He experienced a few dizzy episodes that were very short in duration.  It has not happened in about a month.  He is planning on going hiking with his wife in [place].    Past Medical History:   Diagnosis Date   • Coronary artery disease    • Essential tremor     right hand    • Hypercholesteremia    • ST elevation myocardial infarction (STEMI) (CMS/[facility])        Past Surgical History:   Procedure Laterality Date   • CARDIAC CATHETERIZATION     • CORONARY STENT PLACEMENT      3.5 x 23 Xience in his proximal RCA   • EYE SURGERY     • TONSILLECTOMY         Social History     Socioeconomic History   • Marital status:      Spouse name: Not on file   • Number of children: Not on file   • Years of education: Not on file   • Highest  "education level: Not on file   Tobacco Use   • Smoking status: Former Smoker   • Smokeless tobacco: Never Used   • Tobacco comment: Daily caffeine use   Substance and Sexual Activity   • Alcohol use: Yes     Comment: occ   • Drug use: No       History reviewed. No pertinent family history.    Review of Systems   Constitutional: Negative.   Cardiovascular: Negative.  Negative for chest pain, dyspnea on exertion, leg swelling, orthopnea, paroxysmal nocturnal dyspnea and syncope.   Respiratory: Negative.    Hematologic/Lymphatic: Negative for bleeding problem.   Musculoskeletal: Negative for falls.   Gastrointestinal: Negative for melena.   Neurological: Positive for dizziness. Negative for light-headedness.       Allergies   Allergen Reactions   • Metaxalone          Current Outpatient Medications:   •  aspirin 81 MG tablet, Take  by mouth Daily., Disp: , Rfl:   •  atorvastatin (LIPITOR) 40 MG tablet, Take 1 tablet by mouth Daily., Disp: 90 tablet, Rfl: 3  •  diclofenac (VOLTAREN) 75 MG EC tablet, Take 1 tablet by mouth Daily., Disp: , Rfl:   •  propranolol (INDERAL) 20 MG tablet, Take 20 mg by mouth 2 (Two) Times a Day., Disp: , Rfl:       Objective:     Vitals:    03/31/21 1239 03/31/21 1251   BP: 122/80 128/80   BP Location: Left arm Right arm   Pulse: 54    SpO2: 98%    Weight: 77.1 kg (170 lb)    Height: 177.8 cm (70\")      Body mass index is 24.39 kg/m².    PHYSICAL EXAM:    Neck:      Vascular: No JVD.   Pulmonary:      Effort: Pulmonary effort is normal.      Breath sounds: Normal breath sounds.   Cardiovascular:      Normal rate. Regular rhythm.      Murmurs: There is no murmur.      No gallop. No click. No rub.   Pulses:     Intact distal pulses.           ECG 12 Lead    Date/Time: 3/31/2021 12:56 PM  Performed by: Natasha Adhikari APRN  Authorized by: Natasha Adhikari APRN   Comparison: compared with previous ECG from 2/7/2019  Similar to previous ECG  Rhythm: sinus rhythm  Rate: normal  BPM: 54  T " inversion: III  Other findings: non-specific ST-T wave changes    Clinical impression: normal ECG  Comments: Indication: CAD              Assessment:       Diagnosis Plan   1. Coronary artery disease involving native coronary artery of native heart without angina pectoris  ECG 12 Lead   2. Hypercholesterolemia       Orders Placed This Encounter   Procedures   • ECG 12 Lead     This order was created via procedure documentation          Plan:       1.  Coronary artery disease.  History of inferior STEMI in 2015 with stenting of the RCA.  He denies any symptoms of angina.  He is on good medical therapy with aspirin and atorvastatin.      2.  Hyperlipidemia.  He is on a high intensity statin with 40 mg of atorvastatin daily.  He needs a repeat lipid panel and hepatic function panel which he has promised to complete at his PCP.  I have asked for a copy once completed.  I also recommended he discontinue the fish oil as there really is no benefit and and increases his risk of bleeding.        3.  Dizziness.  Has not been present for a month.  I did tell him if he were to experience recurrence to call me at which time I would recommend a Holter.      Overall I think he is stable and doing well.  He denies any symptoms of angina or heart failure.  I am not going to make any changes, and he will follow-up with Dr. Perrin in 1 year or sooner if needed.      As always, it has been a pleasure to participate in your patient's care.      Sincerely,         MARIANO Ayala

## 2021-04-05 RX ORDER — ATORVASTATIN CALCIUM 40 MG/1
40 TABLET, FILM COATED ORAL DAILY
Qty: 30 TABLET | Refills: 0 | Status: SHIPPED | OUTPATIENT
Start: 2021-04-05 | End: 2021-05-17

## 2021-04-08 ENCOUNTER — OFFICE VISIT CONVERTED (OUTPATIENT)
Dept: FAMILY MEDICINE CLINIC | Facility: CLINIC | Age: 71
End: 2021-04-08
Attending: NURSE PRACTITIONER

## 2021-04-08 ENCOUNTER — HOSPITAL ENCOUNTER (OUTPATIENT)
Dept: FAMILY MEDICINE CLINIC | Facility: CLINIC | Age: 71
Discharge: HOME | End: 2021-04-08
Attending: NURSE PRACTITIONER

## 2021-04-08 ENCOUNTER — CONVERSION ENCOUNTER (OUTPATIENT)
Dept: FAMILY MEDICINE CLINIC | Facility: CLINIC | Age: 71
End: 2021-04-08

## 2021-04-08 LAB
ALBUMIN SERPL-MCNC: 4 G/DL (ref 3.5–5)
ALBUMIN/GLOB SERPL: 1.5 {RATIO} (ref 1.4–2.6)
ALP SERPL-CCNC: 131 U/L (ref 56–155)
ALT SERPL-CCNC: 23 U/L (ref 10–40)
ANION GAP SERPL CALC-SCNC: 15 MMOL/L (ref 8–19)
APPEARANCE UR: CLEAR
AST SERPL-CCNC: 16 U/L (ref 15–50)
BASOPHILS # BLD AUTO: 0.1 10*3/UL (ref 0–0.2)
BASOPHILS NFR BLD AUTO: 1.2 % (ref 0–3)
BILIRUB SERPL-MCNC: 0.66 MG/DL (ref 0.2–1.3)
BILIRUB UR QL: NEGATIVE
BUN SERPL-MCNC: 13 MG/DL (ref 5–25)
BUN/CREAT SERPL: 13 {RATIO} (ref 6–20)
CALCIUM SERPL-MCNC: 9.4 MG/DL (ref 8.7–10.4)
CHLORIDE SERPL-SCNC: 104 MMOL/L (ref 99–111)
CHOLEST SERPL-MCNC: 156 MG/DL (ref 107–200)
CHOLEST/HDLC SERPL: 5.2 {RATIO} (ref 3–6)
COLOR UR: YELLOW
CONV ABS IMM GRAN: 0.02 10*3/UL (ref 0–0.2)
CONV CO2: 21 MMOL/L (ref 22–32)
CONV COLLECTION SOURCE (UA): ABNORMAL
CONV IMMATURE GRAN: 0.2 % (ref 0–1.8)
CONV TOTAL PROTEIN: 6.6 G/DL (ref 6.3–8.2)
CONV UROBILINOGEN IN URINE BY AUTOMATED TEST STRIP: 1 {EHRLICHU}/DL (ref 0.1–1)
CREAT UR-MCNC: 0.99 MG/DL (ref 0.7–1.2)
DEPRECATED RDW RBC AUTO: 43.8 FL (ref 35.1–43.9)
EOSINOPHIL # BLD AUTO: 0.3 10*3/UL (ref 0–0.7)
EOSINOPHIL # BLD AUTO: 3.6 % (ref 0–7)
ERYTHROCYTE [DISTWIDTH] IN BLOOD BY AUTOMATED COUNT: 12.9 % (ref 11.6–14.4)
EST. AVERAGE GLUCOSE BLD GHB EST-MCNC: 269 MG/DL
GFR SERPLBLD BASED ON 1.73 SQ M-ARVRAT: >60 ML/MIN/{1.73_M2}
GLOBULIN UR ELPH-MCNC: 2.6 G/DL (ref 2–3.5)
GLUCOSE SERPL-MCNC: 340 MG/DL (ref 70–99)
GLUCOSE UR QL: >=1000 MG/DL
HBA1C MFR BLD: 11 % (ref 3.5–5.7)
HCT VFR BLD AUTO: 48.3 % (ref 42–52)
HDLC SERPL-MCNC: 30 MG/DL (ref 40–60)
HGB BLD-MCNC: 16.2 G/DL (ref 14–18)
HGB UR QL STRIP: NEGATIVE
KETONES UR QL STRIP: ABNORMAL MG/DL
LDLC SERPL CALC-MCNC: 67 MG/DL (ref 70–100)
LEUKOCYTE ESTERASE UR QL STRIP: NEGATIVE
LYMPHOCYTES # BLD AUTO: 3.21 10*3/UL (ref 1–5)
LYMPHOCYTES NFR BLD AUTO: 38.8 % (ref 20–45)
MCH RBC QN AUTO: 31.4 PG (ref 27–31)
MCHC RBC AUTO-ENTMCNC: 33.5 G/DL (ref 33–37)
MCV RBC AUTO: 93.6 FL (ref 80–96)
MONOCYTES # BLD AUTO: 0.7 10*3/UL (ref 0.2–1.2)
MONOCYTES NFR BLD AUTO: 8.5 % (ref 3–10)
NEUTROPHILS # BLD AUTO: 3.94 10*3/UL (ref 2–8)
NEUTROPHILS NFR BLD AUTO: 47.7 % (ref 30–85)
NITRITE UR QL STRIP: NEGATIVE
NRBC CBCN: 0 % (ref 0–0.7)
OSMOLALITY SERPL CALC.SUM OF ELEC: 296 MOSM/KG (ref 273–304)
PH UR STRIP.AUTO: 6 [PH] (ref 5–8)
PLATELET # BLD AUTO: 184 10*3/UL (ref 130–400)
PMV BLD AUTO: 11.5 FL (ref 9.4–12.4)
POTASSIUM SERPL-SCNC: 4.4 MMOL/L (ref 3.5–5.3)
PROT UR QL: NEGATIVE MG/DL
RBC # BLD AUTO: 5.16 10*6/UL (ref 4.7–6.1)
SODIUM SERPL-SCNC: 136 MMOL/L (ref 135–147)
SP GR UR: 1.02 (ref 1–1.03)
TRIGL SERPL-MCNC: 453 MG/DL (ref 40–150)
WBC # BLD AUTO: 8.27 10*3/UL (ref 4.8–10.8)

## 2021-04-09 LAB
CONV HEPATITIS C AB WITH REFLEX TO CONFIRMATION: <0.1 S/CO RATIO (ref 0–0.9)
CONV HEPATITIS COMMENT: NORMAL

## 2021-04-19 ENCOUNTER — OFFICE VISIT CONVERTED (OUTPATIENT)
Dept: FAMILY MEDICINE CLINIC | Facility: CLINIC | Age: 71
End: 2021-04-19
Attending: NURSE PRACTITIONER

## 2021-04-19 ENCOUNTER — CONVERSION ENCOUNTER (OUTPATIENT)
Dept: FAMILY MEDICINE CLINIC | Facility: CLINIC | Age: 71
End: 2021-04-19

## 2021-05-09 VITALS
BODY MASS INDEX: 26.87 KG/M2 | DIASTOLIC BLOOD PRESSURE: 74 MMHG | TEMPERATURE: 96.9 F | HEART RATE: 61 BPM | SYSTOLIC BLOOD PRESSURE: 118 MMHG | WEIGHT: 187.25 LBS | OXYGEN SATURATION: 95 %

## 2021-05-09 VITALS
HEIGHT: 70 IN | BODY MASS INDEX: 27.06 KG/M2 | SYSTOLIC BLOOD PRESSURE: 118 MMHG | WEIGHT: 189 LBS | HEART RATE: 54 BPM | OXYGEN SATURATION: 98 % | TEMPERATURE: 96.8 F | DIASTOLIC BLOOD PRESSURE: 80 MMHG

## 2021-05-09 VITALS
WEIGHT: 183 LBS | TEMPERATURE: 97.5 F | DIASTOLIC BLOOD PRESSURE: 70 MMHG | SYSTOLIC BLOOD PRESSURE: 130 MMHG | BODY MASS INDEX: 26.2 KG/M2 | OXYGEN SATURATION: 96 % | HEART RATE: 88 BPM | HEIGHT: 70 IN

## 2021-05-09 VITALS
WEIGHT: 188 LBS | SYSTOLIC BLOOD PRESSURE: 120 MMHG | BODY MASS INDEX: 26.97 KG/M2 | DIASTOLIC BLOOD PRESSURE: 62 MMHG | OXYGEN SATURATION: 96 % | TEMPERATURE: 97.6 F | HEART RATE: 73 BPM

## 2021-05-09 VITALS
BODY MASS INDEX: 25.48 KG/M2 | HEART RATE: 79 BPM | HEIGHT: 69 IN | DIASTOLIC BLOOD PRESSURE: 60 MMHG | WEIGHT: 172 LBS | OXYGEN SATURATION: 96 % | SYSTOLIC BLOOD PRESSURE: 118 MMHG | TEMPERATURE: 96.9 F

## 2021-05-09 VITALS
DIASTOLIC BLOOD PRESSURE: 80 MMHG | BODY MASS INDEX: 26.11 KG/M2 | WEIGHT: 182 LBS | TEMPERATURE: 97 F | HEART RATE: 75 BPM | SYSTOLIC BLOOD PRESSURE: 120 MMHG | OXYGEN SATURATION: 98 %

## 2021-05-09 VITALS
SYSTOLIC BLOOD PRESSURE: 110 MMHG | TEMPERATURE: 96.9 F | BODY MASS INDEX: 24.79 KG/M2 | HEART RATE: 61 BPM | HEIGHT: 69 IN | OXYGEN SATURATION: 97 % | WEIGHT: 167.37 LBS | DIASTOLIC BLOOD PRESSURE: 72 MMHG

## 2021-05-09 VITALS
HEIGHT: 70 IN | BODY MASS INDEX: 26.34 KG/M2 | WEIGHT: 184 LBS | TEMPERATURE: 98.3 F | DIASTOLIC BLOOD PRESSURE: 68 MMHG | HEART RATE: 66 BPM | OXYGEN SATURATION: 94 % | SYSTOLIC BLOOD PRESSURE: 110 MMHG

## 2021-05-09 VITALS
HEART RATE: 72 BPM | OXYGEN SATURATION: 94 % | SYSTOLIC BLOOD PRESSURE: 114 MMHG | DIASTOLIC BLOOD PRESSURE: 60 MMHG | BODY MASS INDEX: 26.26 KG/M2 | TEMPERATURE: 96.6 F | WEIGHT: 183 LBS

## 2021-05-09 VITALS
HEART RATE: 70 BPM | HEIGHT: 69 IN | BODY MASS INDEX: 28.29 KG/M2 | DIASTOLIC BLOOD PRESSURE: 70 MMHG | WEIGHT: 191 LBS | SYSTOLIC BLOOD PRESSURE: 119 MMHG | OXYGEN SATURATION: 95 % | TEMPERATURE: 96.8 F

## 2021-05-09 VITALS
SYSTOLIC BLOOD PRESSURE: 118 MMHG | HEIGHT: 70 IN | TEMPERATURE: 96.6 F | DIASTOLIC BLOOD PRESSURE: 76 MMHG | BODY MASS INDEX: 26.63 KG/M2 | WEIGHT: 186 LBS | OXYGEN SATURATION: 99 % | HEART RATE: 66 BPM

## 2021-05-09 VITALS
OXYGEN SATURATION: 95 % | BODY MASS INDEX: 27.26 KG/M2 | SYSTOLIC BLOOD PRESSURE: 130 MMHG | HEART RATE: 63 BPM | TEMPERATURE: 97.2 F | WEIGHT: 190 LBS | DIASTOLIC BLOOD PRESSURE: 80 MMHG

## 2021-05-10 NOTE — H&P
History and Physical      Patient Name: Twin Babcock   Patient ID: [id number]   Sex: Male   YOB: 1950    Primary Care Provider: Debbie QUINTANA   Referring Provider: Debbie QUINTANA    Visit Date: June 16, 2020    Provider: Brittani Koch MD   Location: [facility]   Location Address: [address] [place] [address]  [id number]   Location Phone: [phone]          Chief Complaint  · Left Shoulder Pain      History Of Present Illness  Twin Babcock is a 70 year old /White male who presents today to [facility].      He's here for evaluation of left shoulder pain that radiates down the left upper extremity into the left wrist. Patient states left shoulder pain will also radiate into the scapular region. Patient states increased symptoms with driving. Patient states history of being diagnosed with a left rotator cuff tear that was treated with physical therapy. Patient states he does have some neck pain and history of neck injury. Patient complains of neck crepitus. Patient has attended physical therapy for the neck in the past. He takes Meloxicam.       Past Medical History  Bursitis of right shoulder; Heart Attack; Hyperlipemia; Impingement syndrome of right shoulder         Past Surgical History  heart surgery; Tonsillectomy         Medication List  aspirin 81 mg oral tablet,delayed release (DR/EC); Fish Oil 300-500 mg oral capsule; meloxicam 15 mg oral tablet; propranolol 20 mg oral tablet; Voltaren 1 % topical gel         Allergy List  Celebrex; Skelaxin         Family Medical History  Cancer, Unspecified; *No Known Family History         Social History  Alcohol Use (Current some day); lives with spouse; .; Recreational Drug Use (Never); Tobacco (Former); Working         Review of Systems  · Constitutional  o Denies  o : fever, chills, weight loss  · Cardiovascular  o Denies  o : chest pain, shortness of breath  · Gastrointestinal  o Denies  o : liver  "disease, heartburn, nausea, blood in stools  · Genitourinary  o Denies  o : painful urination, blood in urine  · Integument  o Denies  o : rash, itching  · Neurologic  o Denies  o : headache, weakness, loss of consciousness  · Musculoskeletal  o Denies  o : painful, swollen joints  · Psychiatric  o Denies  o : drug/alcohol addiction, anxiety, depression      Vitals  Date Time BP Position Site L\R Cuff Size HR RR TEMP (F) WT  HT  BMI kg/m2 BSA m2 O2 Sat        06/16/2020 02:05 PM      63 - R   188lbs 0oz 5'  10\" 26.97 2.05 95 %          Physical Examination  · Constitutional  o Appearance  o : well developed, well-nourished, no obvious deformities present  · Head and Face  o Head  o :   § Inspection  § : normocephalic  o Face  o :   § Inspection  § : no facial lesions  · Eyes  o Conjunctivae  o : conjunctivae normal  o Sclerae  o : sclerae white  · Ears, Nose, Mouth and Throat  o Ears  o :   § External Ears  § : appearance within normal limits  § Hearing  § : intact  o Nose  o :   § External Nose  § : appearance normal  · Neck  o Inspection/Palpation  o : normal appearance  o Range of Motion  o : full range of motion  · Respiratory  o Respiratory Effort  o : breathing unlabored  o Inspection of Chest  o : normal appearance  o Auscultation of Lungs  o : no audible wheezing or rales  · Cardiovascular  o Heart  o : regular rate  · Gastrointestinal  o Abdominal Examination  o : soft and non-tender  · Skin and Subcutaneous Tissue  o General Inspection  o : intact, no rashes  · Psychiatric  o General  o : Alert and oriented x3  o Judgement and Insight  o : judgment and insight intact  o Mood and Affect  o : mood normal, affect appropriate  · Left Shoulder  o Inspection  o : Near-full neck range of motion. Shoulder IR L1. Near-full shoulder forward flexion. Positive impingement signs. Neurovascularly intact. Sensation grossly intact. Pulses normal.   · Imaging  o Imaging  o : Left shoulder x-rays 6/3/2020 revealed a " normal examination.           Assessment  · Left shoulder pain, unspecified chronicity     719.41/M25.512  · Shoulder impingement syndrome, left     726.2/M75.42      Plan  · Medications  o Medications have been Reconciled  o Transition of Care or Provider Policy  · Instructions  o Reviewed the patient's Past Medical, Social, and Family history as well as the ROS at today's visit, no changes.  o Call or return if worsening symptoms.  o Exercise handout given.  o Reviewed Xrays.  o The above service was scribed by Kelly Adhikari on my behalf and I attest to the accuracy of the note. mc  o Discussed conservative treatment with patient. The plan is home exercises and a course of physical therapy. Prescribed Voltaren Gel. Follow up prn.   o Electronically Identified Patient Education Materials Provided Electronically            Electronically Signed by: Yen Adhikari - , Other -Author on June 17, 2020 01:34:36 PM  Electronically Co-signed by: Brittani Koch MD -Reviewer on June 18, 2020 10:47:21 AM

## 2021-05-13 NOTE — PROGRESS NOTES
"   Progress Note      Patient Name: Twin Babcock   Patient ID: [id number]   Sex: Male   YOB: 1950    Primary Care Provider: Debbie QUINTANA   Referring Provider: Debbie QUINTANA    Visit Date: June 9, 2020    Provider: Ezequiel Foreman MD   Location: [facility]   Location Address: [address] [place] [address]  [id number]   Location Phone: [phone]          Chief Complaint  · pt here for urologic issues      History Of Present Illness  TELEHEALTH TELEPHONE VISIT  Twin Babcock is a 70 year old /White male who is presenting for evaluation via telehealth telephone visit. Verbal consent obtained before beginning visit.   Provider spent 8 minutes with the patient during the telehealth visit.   The following staff were present during this visit: Josselin Mayfield   Past Medical History/ Overview of Patient Symptoms     61    PSA      6/20  3.26  1/20 4.73  9/19 4.10    No previous PSA\">69-year-old  gentleman here today for elevated PSA    No changes to his medical history    Follows up today with PSA    Voiding without issue. no prostate meds    Previous    No urologic family history, No prostate cancer in the family.  Has never had any urologic surgery.    Passed one kidney stone spontaneously    2015 coronary stent placement, status post MI.  Pulmonary issues patient does not smoke.  ASA 81    9/19 creatinine 1.0, > 60    PSA      6/20  3.26  1/20 4.73  9/19 4.10    No previous PSA       Past Medical History  Bursitis of right shoulder; Heart Attack; Hyperlipemia; Impingement syndrome of right shoulder         Past Surgical History  heart surgery; Tonsillectomy         Medication List  aspirin 81 mg oral tablet,delayed release (DR/EC); Fish Oil 300-500 mg oral capsule; meloxicam 15 mg oral tablet; propranolol 20 mg oral tablet         Allergy List  Celebrex; Skelaxin         Family Medical History  *No Known Family History         Social History  Alcohol Use " "(Current some day); lives with spouse; .; Recreational Drug Use (Never); Tobacco (Former); Working         Review of Systems  · Constitutional  o Denies  o : chills  · Gastrointestinal  o Denies  o : nausea      Vitals  Date Time BP Position Site L\R Cuff Size HR RR TEMP (F) WT  HT  BMI kg/m2 BSA m2 O2 Sat HC       02/07/2020 09:06 AM       16  188lbs 0oz 5'  11\" 26.22 2.07                   Assessment  · Elevated PSA     790.93/R97.20    Problems Reconciled  Plan  · Orders  o Physican Telephone evaluation, 5-10 min ([id number]) - 790.93/R97.20 - 06/09/2020  o PSA ultrasensitive DIAGNOSTIC [facility] ([id number]) - 790.93/R97.20 - 06/09/2021  · Medications  o Medications have been Reconciled  o Transition of Care or Provider Policy  · Instructions  o Plan Of Care:   o Electronically Identified Patient Education Materials Provided Electronically         PSA lower.  Now within normal range for his age.  Patient given reassurance.  Follow-up in 1 year with PSA before  If stable we will send back to PCP after that             Electronically Signed by: Ezequiel Foreman MD -Author on June 9, 2020 03:35:15 PM  "

## 2021-05-15 VITALS — OXYGEN SATURATION: 95 % | BODY MASS INDEX: 26.92 KG/M2 | HEIGHT: 70 IN | HEART RATE: 63 BPM | WEIGHT: 188 LBS

## 2021-05-15 VITALS — BODY MASS INDEX: 26.32 KG/M2 | HEIGHT: 71 IN | WEIGHT: 188 LBS | RESPIRATION RATE: 16 BRPM

## 2021-05-15 VITALS — WEIGHT: 192 LBS | HEIGHT: 70 IN | BODY MASS INDEX: 27.49 KG/M2 | RESPIRATION RATE: 16 BRPM

## 2021-05-16 VITALS — HEIGHT: 71 IN | HEART RATE: 66 BPM | BODY MASS INDEX: 25.81 KG/M2 | WEIGHT: 184.37 LBS | OXYGEN SATURATION: 97 %

## 2021-05-16 VITALS — BODY MASS INDEX: 25.79 KG/M2 | HEIGHT: 71 IN | WEIGHT: 184.25 LBS

## 2021-05-16 VITALS — WEIGHT: 183.37 LBS | HEIGHT: 71 IN | HEART RATE: 58 BPM | BODY MASS INDEX: 25.67 KG/M2 | OXYGEN SATURATION: 97 %

## 2021-05-16 VITALS — BODY MASS INDEX: 25.62 KG/M2 | HEIGHT: 71 IN | HEART RATE: 80 BPM | OXYGEN SATURATION: 97 % | WEIGHT: 183 LBS

## 2021-05-17 RX ORDER — ATORVASTATIN CALCIUM 40 MG/1
40 TABLET, FILM COATED ORAL DAILY
Qty: 30 TABLET | Refills: 0 | Status: SHIPPED | OUTPATIENT
Start: 2021-05-17 | End: 2021-06-07

## 2021-06-07 RX ORDER — ATORVASTATIN CALCIUM 40 MG/1
40 TABLET, FILM COATED ORAL DAILY
Qty: 90 TABLET | Refills: 0 | Status: SHIPPED | OUTPATIENT
Start: 2021-06-07 | End: 2021-09-10

## 2021-07-01 ENCOUNTER — CLINICAL SUPPORT (OUTPATIENT)
Dept: GASTROENTEROLOGY | Facility: CLINIC | Age: 71
End: 2021-07-01

## 2021-07-01 ENCOUNTER — PREP FOR SURGERY (OUTPATIENT)
Dept: OTHER | Facility: HOSPITAL | Age: 71
End: 2021-07-01

## 2021-07-01 DIAGNOSIS — Z12.11 SCREEN FOR COLON CANCER: Primary | ICD-10-CM

## 2021-07-01 PROBLEM — H26.9 CATARACT: Status: ACTIVE | Noted: 2021-07-01

## 2021-07-01 PROBLEM — E11.9 DIABETES MELLITUS, TYPE 2 (HCC): Status: ACTIVE | Noted: 2021-04-19

## 2021-07-01 PROBLEM — R73.02 IMPAIRED GLUCOSE TOLERANCE: Status: ACTIVE | Noted: 2021-04-08

## 2021-07-01 PROBLEM — M75.51 BURSITIS OF RIGHT SHOULDER: Status: ACTIVE | Noted: 2018-05-04

## 2021-07-01 PROBLEM — I21.9 MI (MYOCARDIAL INFARCTION) (HCC): Status: ACTIVE | Noted: 2017-01-09

## 2021-07-01 PROBLEM — R25.1 TREMOR: Status: ACTIVE | Noted: 2018-12-07

## 2021-07-01 PROBLEM — E78.5 HYPERLIPIDEMIA: Status: ACTIVE | Noted: 2018-01-15

## 2021-07-01 PROBLEM — M19.90 OSTEOARTHRITIS: Status: ACTIVE | Noted: 2020-10-02

## 2021-07-01 PROBLEM — M75.41 IMPINGEMENT SYNDROME OF RIGHT SHOULDER: Status: ACTIVE | Noted: 2018-03-02

## 2021-07-01 RX ORDER — SOD SULF/POT CHLORIDE/MAG SULF 1.479 G
12 TABLET ORAL DAILY
Qty: 24 TABLET | Refills: 0 | Status: SHIPPED | OUTPATIENT
Start: 2021-07-01 | End: 2022-09-12

## 2021-07-01 RX ORDER — PREDNISONE 20 MG/1
TABLET ORAL
COMMUNITY
End: 2021-08-02

## 2021-07-01 RX ORDER — DAPAGLIFLOZIN 10 MG/1
TABLET, FILM COATED ORAL
COMMUNITY
End: 2021-08-05 | Stop reason: SDUPTHER

## 2021-07-28 DIAGNOSIS — E11.9 TYPE 2 DIABETES MELLITUS WITHOUT COMPLICATION, WITHOUT LONG-TERM CURRENT USE OF INSULIN (HCC): Primary | ICD-10-CM

## 2021-08-02 ENCOUNTER — OFFICE VISIT (OUTPATIENT)
Dept: FAMILY MEDICINE CLINIC | Facility: CLINIC | Age: 71
End: 2021-08-02

## 2021-08-02 VITALS
HEART RATE: 51 BPM | BODY MASS INDEX: 24.17 KG/M2 | HEIGHT: 70 IN | TEMPERATURE: 97.8 F | DIASTOLIC BLOOD PRESSURE: 68 MMHG | SYSTOLIC BLOOD PRESSURE: 102 MMHG | OXYGEN SATURATION: 95 % | WEIGHT: 168.8 LBS

## 2021-08-02 DIAGNOSIS — Z12.5 PROSTATE CANCER SCREENING: Primary | ICD-10-CM

## 2021-08-02 DIAGNOSIS — E11.65 TYPE 2 DIABETES MELLITUS WITH HYPERGLYCEMIA, WITHOUT LONG-TERM CURRENT USE OF INSULIN (HCC): ICD-10-CM

## 2021-08-02 LAB
ALBUMIN SERPL-MCNC: 4.5 G/DL (ref 3.5–5.2)
ALBUMIN/GLOB SERPL: 1.7 G/DL
ALP SERPL-CCNC: 115 U/L (ref 39–117)
ALT SERPL W P-5'-P-CCNC: 21 U/L (ref 1–41)
ANION GAP SERPL CALCULATED.3IONS-SCNC: 11.2 MMOL/L (ref 5–15)
AST SERPL-CCNC: 16 U/L (ref 1–40)
BASOPHILS # BLD AUTO: 0.11 10*3/MM3 (ref 0–0.2)
BASOPHILS NFR BLD AUTO: 1.4 % (ref 0–1.5)
BILIRUB SERPL-MCNC: 0.6 MG/DL (ref 0–1.2)
BILIRUB UR QL STRIP: NEGATIVE
BUN SERPL-MCNC: 11 MG/DL (ref 8–23)
BUN/CREAT SERPL: 13.3 (ref 7–25)
CALCIUM SPEC-SCNC: 10.1 MG/DL (ref 8.6–10.5)
CHLORIDE SERPL-SCNC: 103 MMOL/L (ref 98–107)
CHOLEST SERPL-MCNC: 131 MG/DL (ref 0–200)
CLARITY UR: CLEAR
CO2 SERPL-SCNC: 24.8 MMOL/L (ref 22–29)
COLOR UR: YELLOW
CREAT SERPL-MCNC: 0.83 MG/DL (ref 0.76–1.27)
DEPRECATED RDW RBC AUTO: 41.9 FL (ref 37–54)
EOSINOPHIL # BLD AUTO: 0.32 10*3/MM3 (ref 0–0.4)
EOSINOPHIL NFR BLD AUTO: 4 % (ref 0.3–6.2)
ERYTHROCYTE [DISTWIDTH] IN BLOOD BY AUTOMATED COUNT: 12.2 % (ref 12.3–15.4)
GFR SERPL CREATININE-BSD FRML MDRD: 92 ML/MIN/1.73
GLOBULIN UR ELPH-MCNC: 2.6 GM/DL
GLUCOSE SERPL-MCNC: 97 MG/DL (ref 65–99)
GLUCOSE UR STRIP-MCNC: ABNORMAL MG/DL
HBA1C MFR BLD: 6.05 % (ref 4.8–5.6)
HCT VFR BLD AUTO: 51.9 % (ref 37.5–51)
HDLC SERPL-MCNC: 35 MG/DL (ref 40–60)
HGB BLD-MCNC: 17.7 G/DL (ref 13–17.7)
HGB UR QL STRIP.AUTO: NEGATIVE
IMM GRANULOCYTES # BLD AUTO: 0.01 10*3/MM3 (ref 0–0.05)
IMM GRANULOCYTES NFR BLD AUTO: 0.1 % (ref 0–0.5)
KETONES UR QL STRIP: NEGATIVE
LDLC SERPL CALC-MCNC: 77 MG/DL (ref 0–100)
LDLC/HDLC SERPL: 2.15 {RATIO}
LEUKOCYTE ESTERASE UR QL STRIP.AUTO: NEGATIVE
LYMPHOCYTES # BLD AUTO: 3.46 10*3/MM3 (ref 0.7–3.1)
LYMPHOCYTES NFR BLD AUTO: 43.6 % (ref 19.6–45.3)
MCH RBC QN AUTO: 31.7 PG (ref 26.6–33)
MCHC RBC AUTO-ENTMCNC: 34.1 G/DL (ref 31.5–35.7)
MCV RBC AUTO: 92.8 FL (ref 79–97)
MONOCYTES # BLD AUTO: 0.67 10*3/MM3 (ref 0.1–0.9)
MONOCYTES NFR BLD AUTO: 8.4 % (ref 5–12)
NEUTROPHILS NFR BLD AUTO: 3.37 10*3/MM3 (ref 1.7–7)
NEUTROPHILS NFR BLD AUTO: 42.5 % (ref 42.7–76)
NITRITE UR QL STRIP: NEGATIVE
NRBC BLD AUTO-RTO: 0 /100 WBC (ref 0–0.2)
PH UR STRIP.AUTO: 6 [PH] (ref 5–8)
PLATELET # BLD AUTO: 276 10*3/MM3 (ref 140–450)
PMV BLD AUTO: 10.1 FL (ref 6–12)
POTASSIUM SERPL-SCNC: 4.2 MMOL/L (ref 3.5–5.2)
PROT SERPL-MCNC: 7.1 G/DL (ref 6–8.5)
PROT UR QL STRIP: NEGATIVE
PSA SERPL-MCNC: 3.21 NG/ML (ref 0–4)
RBC # BLD AUTO: 5.59 10*6/MM3 (ref 4.14–5.8)
SODIUM SERPL-SCNC: 139 MMOL/L (ref 136–145)
SP GR UR STRIP: 1.03 (ref 1–1.03)
TRIGL SERPL-MCNC: 103 MG/DL (ref 0–150)
TSH SERPL DL<=0.05 MIU/L-ACNC: 1.81 UIU/ML (ref 0.27–4.2)
UROBILINOGEN UR QL STRIP: ABNORMAL
VLDLC SERPL-MCNC: 19 MG/DL (ref 5–40)
WBC # BLD AUTO: 7.94 10*3/MM3 (ref 3.4–10.8)

## 2021-08-02 PROCEDURE — 36415 COLL VENOUS BLD VENIPUNCTURE: CPT | Performed by: NURSE PRACTITIONER

## 2021-08-02 PROCEDURE — 85025 COMPLETE CBC W/AUTO DIFF WBC: CPT | Performed by: NURSE PRACTITIONER

## 2021-08-02 PROCEDURE — 84443 ASSAY THYROID STIM HORMONE: CPT | Performed by: NURSE PRACTITIONER

## 2021-08-02 PROCEDURE — 80061 LIPID PANEL: CPT | Performed by: NURSE PRACTITIONER

## 2021-08-02 PROCEDURE — G0103 PSA SCREENING: HCPCS | Performed by: NURSE PRACTITIONER

## 2021-08-02 PROCEDURE — 80053 COMPREHEN METABOLIC PANEL: CPT | Performed by: NURSE PRACTITIONER

## 2021-08-02 PROCEDURE — 82043 UR ALBUMIN QUANTITATIVE: CPT | Performed by: NURSE PRACTITIONER

## 2021-08-02 PROCEDURE — 99397 PER PM REEVAL EST PAT 65+ YR: CPT | Performed by: NURSE PRACTITIONER

## 2021-08-02 PROCEDURE — 83036 HEMOGLOBIN GLYCOSYLATED A1C: CPT | Performed by: NURSE PRACTITIONER

## 2021-08-02 PROCEDURE — 81003 URINALYSIS AUTO W/O SCOPE: CPT | Performed by: NURSE PRACTITIONER

## 2021-08-02 NOTE — PROGRESS NOTES
Venipuncture Blood Specimen Collection  Venipuncture performed in left arm  by Sulma Pang with good hemostasis. Patient tolerated the procedure well without complications.   08/02/21   Sulma Pang

## 2021-08-02 NOTE — PROGRESS NOTES
Chief Complaint  Annual Exam (well check before he starts Medicare next month)      Subjective        Past Medical History:   Diagnosis Date   • Coronary artery disease    • Diabetes mellitus (CMS/HCC)    • Essential tremor     right hand    • Hypercholesteremia    • ST elevation myocardial infarction (STEMI) (CMS/[facility])      Social History     Tobacco Use   • Smoking status: Former Smoker   • Smokeless tobacco: Never Used   • Tobacco comment: Daily caffeine use   Vaping Use   • Vaping Use: Never used   Substance Use Topics   • Alcohol use: Yes     Comment: occ   • Drug use: No      Current Outpatient Medications on File Prior to Visit   Medication Sig   • aspirin 81 MG tablet Take  by mouth Daily.   • atorvastatin (LIPITOR) 40 MG tablet TAKE 1 TABLET BY MOUTH DAILY   • Dapagliflozin Propanediol (Farxiga) 10 MG tablet Take  by mouth.   • metFORMIN (GLUCOPHAGE) 500 MG tablet Take 500 mg by mouth 2 (Two) Times a Day With Meals.   • propranolol (INDERAL) 20 MG tablet Take 20 mg by mouth 2 (Two) Times a Day.   • Sodium Sulfate-Mag Sulfate-KCl (Sutab) 1510-088-360 MG tablet Take 12 tablets by mouth Daily. BIN: [id number]. PCN: [id number]. GROUP: [id number]. MEMBER ID: [id number]   • [DISCONTINUED] predniSONE (DELTASONE) 20 MG tablet      No current facility-administered medications on file prior to visit.      Allergies   Allergen Reactions   • Celecoxib Unknown - Low Severity   • Metaxalone Unknown - Low Severity      Health Maintenance Due   Topic Date Due   • URINE MICROALBUMIN  Never done   • COLORECTAL CANCER SCREENING  Never done   • ANNUAL PHYSICAL  Never done   • ZOSTER VACCINE (1 of 2) Never done   • DIABETIC EYE EXAM  Never done   • Pneumococcal Vaccine 65+ (2 of 2 - PPSV23) 10/02/2020      Twin Babcock presents to [facility]  Here for general wellness exam prior to retiring at the end of the month and getting on Medicare     Has Colonoscopy scheduled for end of the month.    Hx of  "seeing urology in past for prostate - about 1 year since last PSA and prostate exam     DM: states he is taking 1 tablet twice daily of metformin and barely able to tolerate the diarrhea     Will occasionally wake once nightly to void           Objective   Vital Signs:   /68 (BP Location: Left arm)   Pulse 51   Temp 97.8 °F (36.6 °C)   Ht 177.8 cm (70\")   Wt 76.6 kg (168 lb 12.8 oz)   SpO2 95%   BMI 24.22 kg/m²     Review of Systems   Respiratory: Negative for cough, shortness of breath and wheezing.    Cardiovascular: Negative for chest pain and palpitations.   Endocrine: Negative for polydipsia and polyuria.   Neurological: Negative for dizziness and light-headedness.      Physical Exam  Vitals reviewed.   Constitutional:       General: He is not in acute distress.     Appearance: Normal appearance. He is well-developed.   HENT:      Head: Normocephalic and atraumatic.   Eyes:      Conjunctiva/sclera: Conjunctivae normal.      Pupils: Pupils are equal, round, and reactive to light.   Cardiovascular:      Rate and Rhythm: Normal rate and regular rhythm.      Heart sounds: Normal heart sounds.   Pulmonary:      Effort: Pulmonary effort is normal.      Breath sounds: Normal breath sounds. No wheezing or rhonchi.   Musculoskeletal:      Cervical back: Neck supple.      Right lower leg: No edema.      Left lower leg: No edema.   Skin:     General: Skin is warm and dry.   Neurological:      Mental Status: He is alert and oriented to person, place, and time.      Cranial Nerves: No cranial nerve deficit.   Psychiatric:         Mood and Affect: Mood and affect normal.         Behavior: Behavior normal.         Thought Content: Thought content normal.         Judgment: Judgment normal.        Result Review :   The following data was reviewed by: MARIANO Irving on 08/02/2021:                Assessment and Plan    Diagnoses and all orders for this visit:    1. Prostate cancer screening (Primary)  -     " PSA SCREENING    2. Type 2 diabetes mellitus with hyperglycemia, without long-term current use of insulin (CMS/[facility])  -     Urinalysis With Culture If Indicated - Urine, Clean Catch  -     CBC & Differential  -     Comprehensive Metabolic Panel  -     Hemoglobin A1c  -     Lipid Panel  -     MicroAlbumin, Urine, Random - Urine, Clean Catch  -     TSH Rfx On Abnormal To Free T4        Follow Up   Return in about 3 months (around 11/2/2021) for Next scheduled follow up.     Pending labs will determine need for medication changes/adjustments.    Patient was given instructions and counseling regarding his condition or for health maintenance advice. Please see specific information pulled into the AVS if appropriate.

## 2021-08-03 LAB — ALBUMIN UR-MCNC: <1.2 MG/DL

## 2021-08-03 RX ORDER — DAPAGLIFLOZIN 10 MG/1
TABLET, FILM COATED ORAL
Qty: 90 TABLET | OUTPATIENT
Start: 2021-08-03

## 2021-08-05 RX ORDER — DAPAGLIFLOZIN 10 MG/1
10 TABLET, FILM COATED ORAL DAILY
Qty: 90 TABLET | Refills: 0 | Status: SHIPPED | OUTPATIENT
Start: 2021-08-05 | End: 2021-11-30

## 2021-08-16 NOTE — PRE-PROCEDURE INSTRUCTIONS
INSTRUCTED ON LAXATIVE PREP-PRE OP MEDS-CLEAR LIQUID DIET-SMALL SIP OF WATER WITH MEDS    MEDS TO TAKE    INDERAL   
(3) walks occasionally

## 2021-08-17 ENCOUNTER — ANESTHESIA (OUTPATIENT)
Dept: GASTROENTEROLOGY | Facility: HOSPITAL | Age: 71
End: 2021-08-17

## 2021-08-17 ENCOUNTER — ANESTHESIA EVENT (OUTPATIENT)
Dept: GASTROENTEROLOGY | Facility: HOSPITAL | Age: 71
End: 2021-08-17

## 2021-08-17 ENCOUNTER — HOSPITAL ENCOUNTER (OUTPATIENT)
Facility: HOSPITAL | Age: 71
Setting detail: HOSPITAL OUTPATIENT SURGERY
Discharge: HOME | End: 2021-08-17
Attending: INTERNAL MEDICINE | Admitting: INTERNAL MEDICINE

## 2021-08-17 VITALS
SYSTOLIC BLOOD PRESSURE: 133 MMHG | WEIGHT: 164.46 LBS | BODY MASS INDEX: 23.55 KG/M2 | TEMPERATURE: 97 F | DIASTOLIC BLOOD PRESSURE: 74 MMHG | RESPIRATION RATE: 12 BRPM | HEIGHT: 70 IN | OXYGEN SATURATION: 100 % | HEART RATE: 53 BPM

## 2021-08-17 DIAGNOSIS — Z12.11 SCREEN FOR COLON CANCER: ICD-10-CM

## 2021-08-17 LAB — GLUCOSE BLDC GLUCOMTR-MCNC: 96 MG/DL (ref 70–99)

## 2021-08-17 PROCEDURE — 25010000002 PROPOFOL 10 MG/ML EMULSION: Performed by: NURSE ANESTHETIST, CERTIFIED REGISTERED

## 2021-08-17 PROCEDURE — 88305 TISSUE EXAM BY PATHOLOGIST: CPT | Performed by: INTERNAL MEDICINE

## 2021-08-17 PROCEDURE — 82962 GLUCOSE BLOOD TEST: CPT

## 2021-08-17 PROCEDURE — 45385 COLONOSCOPY W/LESION REMOVAL: CPT | Performed by: INTERNAL MEDICINE

## 2021-08-17 PROCEDURE — 45380 COLONOSCOPY AND BIOPSY: CPT | Performed by: INTERNAL MEDICINE

## 2021-08-17 RX ORDER — LIDOCAINE HYDROCHLORIDE 20 MG/ML
INJECTION, SOLUTION INFILTRATION; PERINEURAL AS NEEDED
Status: DISCONTINUED | OUTPATIENT
Start: 2021-08-17 | End: 2021-08-17 | Stop reason: SURG

## 2021-08-17 RX ORDER — SODIUM CHLORIDE, SODIUM LACTATE, POTASSIUM CHLORIDE, CALCIUM CHLORIDE 600; 310; 30; 20 MG/100ML; MG/100ML; MG/100ML; MG/100ML
30 INJECTION, SOLUTION INTRAVENOUS CONTINUOUS
Status: DISCONTINUED | OUTPATIENT
Start: 2021-08-17 | End: 2021-08-17 | Stop reason: HOSPADM

## 2021-08-17 RX ADMIN — SODIUM CHLORIDE, POTASSIUM CHLORIDE, SODIUM LACTATE AND CALCIUM CHLORIDE 30 ML/HR: 600; 310; 30; 20 INJECTION, SOLUTION INTRAVENOUS at 11:59

## 2021-08-17 RX ADMIN — PROPOFOL 125 MCG/KG/MIN: 10 INJECTION, EMULSION INTRAVENOUS at 12:31

## 2021-08-17 RX ADMIN — LIDOCAINE HYDROCHLORIDE 80 MG: 20 INJECTION, SOLUTION INFILTRATION; PERINEURAL at 12:31

## 2021-08-17 NOTE — ANESTHESIA POSTPROCEDURE EVALUATION
Patient: Twin Babcock    Procedure Summary     Date: 08/17/21 Room / Location: [facility] ENDOSCOPY 2 / [facility]OSCOPY    Anesthesia Start: 1229 Anesthesia Stop: 1305    Procedure: COLONOSCOPY WITH BIOPSIES, POLYPECTOMY (N/A ) Diagnosis:       Screen for colon cancer      (Screen for colon cancer [Z12.11])    Surgeons: Rajwinder Byrnes MD Provider: Johnny Booker MD    Anesthesia Type: general ASA Status: 2          Anesthesia Type: general    Vitals  Vitals Value Taken Time   /71 08/17/21 1312   Temp 36 °C (96.8 °F) 08/17/21 1301   Pulse 55 08/17/21 1313   Resp 14 08/17/21 1311   SpO2 97 % 08/17/21 1313   Vitals shown include unvalidated device data.        Post Anesthesia Care and Evaluation    Patient location during evaluation: bedside  Patient participation: complete - patient participated  Level of consciousness: awake and alert  Pain management: adequate  Airway patency: patent  Anesthetic complications: No anesthetic complications  PONV Status: none  Cardiovascular status: acceptable  Respiratory status: acceptable  Hydration status: acceptable

## 2021-08-17 NOTE — ANESTHESIA POSTPROCEDURE EVALUATION
Patient: Twin Babcock    Procedure Summary     Date: 08/17/21 Room / Location: [facility] ENDOSCOPY 2 / [facility] ENDOSCOPY    Anesthesia Start: 1229 Anesthesia Stop: 1305    Procedure: COLONOSCOPY WITH BIOPSIES, POLYPECTOMY (N/A ) Diagnosis:       Screen for colon cancer      (Screen for colon cancer [Z12.11])    Surgeons: Rajwinder Byrnes MD Provider: Johnny Booker MD    Anesthesia Type: general ASA Status: 2          Anesthesia Type: general    Vitals  Vitals Value Taken Time   /71 08/17/21 1312   Temp 36 °C (96.8 °F) 08/17/21 1301   Pulse 55 08/17/21 1313   Resp 14 08/17/21 1311   SpO2 97 % 08/17/21 1313   Vitals shown include unvalidated device data.        Post Anesthesia Care and Evaluation    Patient location during evaluation: bedside  Patient participation: complete - patient participated  Level of consciousness: awake and alert  Pain management: adequate  Airway patency: patent  Anesthetic complications: No anesthetic complications  PONV Status: none  Cardiovascular status: acceptable  Respiratory status: acceptable  Hydration status: acceptable

## 2021-08-17 NOTE — ANESTHESIA PREPROCEDURE EVALUATION
Anesthesia Evaluation     Patient summary reviewed and Nursing notes reviewed   no history of anesthetic complications:  NPO Solid Status: > 8 hours  NPO Liquid Status: > 2 hours           Airway   Mallampati: II  TM distance: >3 FB  Neck ROM: full  No difficulty expected  Dental      Pulmonary - negative pulmonary ROS and normal exam    breath sounds clear to auscultation  Cardiovascular - normal exam  Exercise tolerance: good (4-7 METS)    Rhythm: regular    (+) past MI , CAD, hyperlipidemia,       Neuro/Psych  (+) tremors,     GI/Hepatic/Renal/Endo    (+)   diabetes mellitus,     Musculoskeletal     Abdominal    Substance History - negative use     OB/GYN negative ob/gyn ROS         Other   arthritis,                      Anesthesia Plan    ASA 2     general   (Total IV Anesthesia    Patient understands anesthesia not responsible for dental damage.  )  intravenous induction     Anesthetic plan, all risks, benefits, and alternatives have been provided, discussed and informed consent has been obtained with: patient.    Plan discussed with CRNA.

## 2021-08-17 NOTE — H&P
Pre Procedure History & Physical    Chief Complaint:   Screening colonoscopy    Subjective     HPI:   72 yo M here for screening colonoscopy.    Past Medical History:   Past Medical History:   Diagnosis Date   • Arthritis     HANDS AND SHOULDER   • Coronary artery disease    • Diabetes mellitus (CMS/HCC)    • Essential tremor     right hand    • Hypercholesteremia    • ST elevation myocardial infarction (STEMI) (CMS/HCC)        Past Surgical History:  Past Surgical History:   Procedure Laterality Date   • CARDIAC CATHETERIZATION     • CATARACT EXTRACTION, BILATERAL     • COLONOSCOPY     • CORONARY STENT PLACEMENT      3.5 x 23 Xience in his proximal RCA   • TONSILLECTOMY         Family History:  Family History   Problem Relation Age of Onset   • No Known Problems Mother    • No Known Problems Father    • Malig Hyperthermia Neg Hx        Social History:   reports that he has quit smoking. He has never used smokeless tobacco. He reports current alcohol use. He reports that he does not use drugs.    Medications:   Medications Prior to Admission   Medication Sig Dispense Refill Last Dose   • aspirin 81 MG tablet Take  by mouth Daily.   8/16/2021 at Unknown time   • atorvastatin (LIPITOR) 40 MG tablet TAKE 1 TABLET BY MOUTH DAILY (Patient taking differently: Take 40 mg by mouth Every Night.) 90 tablet 0 8/16/2021 at Unknown time   • Dapagliflozin Propanediol (Farxiga) 10 MG tablet Take 10 mg by mouth Daily. 90 tablet 0 8/16/2021 at Unknown time   • metFORMIN (GLUCOPHAGE) 500 MG tablet Take 1 tablet by mouth 2 (Two) Times a Day With Meals. 180 tablet 0 8/16/2021 at Unknown time   • propranolol (INDERAL) 20 MG tablet Take 20 mg by mouth 2 (Two) Times a Day.   8/16/2021 at Unknown time   • Sodium Sulfate-Mag Sulfate-KCl (Sutab) 8949-629-149 MG tablet Take 12 tablets by mouth Daily. BIN: [id number]. PCN: SANTOSH. GROUP: [id number]. MEMBER ID: [id number] 24 tablet 0        Allergies:  Celecoxib and Metaxalone    ROS:    Pertinent  "items are noted in HPI     Objective     Blood pressure 121/67, pulse 52, temperature 97.2 °F (36.2 °C), temperature source Temporal, resp. rate 18, height 177.8 cm (70\"), weight 74.6 kg (164 lb 7.4 oz), SpO2 98 %.    Physical Exam   Constitutional: Pt is oriented to person, place, and time and well-developed, well-nourished, and in no distress.   Mouth/Throat: Oropharynx is clear and moist.   Neck: Normal range of motion.   Cardiovascular: Normal rate, regular rhythm and normal heart sounds.    Pulmonary/Chest: Effort normal and breath sounds normal.   Abdominal: Soft. Nontender  Skin: Skin is warm and dry.   Psychiatric: Mood, memory, affect and judgment normal.     Assessment/Plan     Diagnosis:  Screening colonoscopy    Anticipated Surgical Procedure:  Colonoscopy    The risks, benefits, and alternatives of this procedure have been discussed with the patient or the responsible party- the patient understands and agrees to proceed.          "

## 2021-08-18 LAB
CYTO UR: NORMAL
LAB AP CASE REPORT: NORMAL
LAB AP CLINICAL INFORMATION: NORMAL
PATH REPORT.FINAL DX SPEC: NORMAL
PATH REPORT.GROSS SPEC: NORMAL

## 2021-08-20 ENCOUNTER — TELEPHONE (OUTPATIENT)
Dept: GASTROENTEROLOGY | Facility: CLINIC | Age: 71
End: 2021-08-20

## 2021-08-20 NOTE — TELEPHONE ENCOUNTER
Added patient to 5 year colon list and mailed letters to pcp and pt    ----- Message from Rajwinder Byrnes MD sent at 8/18/2021  8:42 PM EDT -----  Recommend repeat colonoscopy in 5 years for colon polyp surveillance.  Please mail results to patient and PCP.  thanks

## 2021-09-10 RX ORDER — ATORVASTATIN CALCIUM 40 MG/1
40 TABLET, FILM COATED ORAL DAILY
Qty: 90 TABLET | Refills: 0 | Status: SHIPPED | OUTPATIENT
Start: 2021-09-10 | End: 2021-12-09

## 2021-09-27 RX ORDER — PROPRANOLOL HYDROCHLORIDE 20 MG/1
TABLET ORAL
Qty: 180 TABLET | Refills: 0 | Status: SHIPPED | OUTPATIENT
Start: 2021-09-27 | End: 2021-10-28 | Stop reason: SDUPTHER

## 2021-10-28 RX ORDER — PROPRANOLOL HYDROCHLORIDE 20 MG/1
TABLET ORAL
Qty: 180 TABLET | Refills: 0 | Status: SHIPPED | OUTPATIENT
Start: 2021-10-28 | End: 2022-02-19

## 2021-11-28 DIAGNOSIS — E11.9 TYPE 2 DIABETES MELLITUS WITHOUT COMPLICATION, WITHOUT LONG-TERM CURRENT USE OF INSULIN (HCC): ICD-10-CM

## 2021-11-29 DIAGNOSIS — E11.9 TYPE 2 DIABETES MELLITUS WITHOUT COMPLICATION, WITHOUT LONG-TERM CURRENT USE OF INSULIN (HCC): ICD-10-CM

## 2021-11-30 RX ORDER — DAPAGLIFLOZIN 10 MG/1
TABLET, FILM COATED ORAL
Qty: 90 TABLET | Refills: 0 | Status: SHIPPED | OUTPATIENT
Start: 2021-11-30 | End: 2022-02-25

## 2021-11-30 RX ORDER — DAPAGLIFLOZIN 10 MG/1
TABLET, FILM COATED ORAL
Qty: 90 TABLET | Refills: 0 | OUTPATIENT
Start: 2021-11-30

## 2021-12-09 RX ORDER — ATORVASTATIN CALCIUM 40 MG/1
40 TABLET, FILM COATED ORAL DAILY
Qty: 90 TABLET | Refills: 0 | Status: SHIPPED | OUTPATIENT
Start: 2021-12-09 | End: 2022-03-04 | Stop reason: SDUPTHER

## 2021-12-30 DIAGNOSIS — E11.9 TYPE 2 DIABETES MELLITUS WITHOUT COMPLICATION, WITHOUT LONG-TERM CURRENT USE OF INSULIN (HCC): ICD-10-CM

## 2022-02-19 RX ORDER — PROPRANOLOL HYDROCHLORIDE 20 MG/1
TABLET ORAL
Qty: 180 TABLET | Refills: 0 | Status: SHIPPED | OUTPATIENT
Start: 2022-02-19 | End: 2022-03-04 | Stop reason: SDUPTHER

## 2022-02-25 DIAGNOSIS — E11.9 TYPE 2 DIABETES MELLITUS WITHOUT COMPLICATION, WITHOUT LONG-TERM CURRENT USE OF INSULIN: ICD-10-CM

## 2022-02-25 RX ORDER — DAPAGLIFLOZIN 10 MG/1
TABLET, FILM COATED ORAL
Qty: 30 TABLET | Refills: 0 | Status: SHIPPED | OUTPATIENT
Start: 2022-02-25 | End: 2022-03-04 | Stop reason: SDUPTHER

## 2022-03-04 ENCOUNTER — OFFICE VISIT (OUTPATIENT)
Dept: FAMILY MEDICINE CLINIC | Facility: CLINIC | Age: 72
End: 2022-03-04

## 2022-03-04 VITALS
OXYGEN SATURATION: 97 % | WEIGHT: 174 LBS | HEIGHT: 70 IN | BODY MASS INDEX: 24.91 KG/M2 | HEART RATE: 66 BPM | DIASTOLIC BLOOD PRESSURE: 68 MMHG | TEMPERATURE: 97.3 F | SYSTOLIC BLOOD PRESSURE: 116 MMHG

## 2022-03-04 DIAGNOSIS — R25.1 TREMOR OF RIGHT HAND: ICD-10-CM

## 2022-03-04 DIAGNOSIS — E78.5 HYPERLIPIDEMIA, UNSPECIFIED HYPERLIPIDEMIA TYPE: ICD-10-CM

## 2022-03-04 DIAGNOSIS — E11.9 TYPE 2 DIABETES MELLITUS WITHOUT COMPLICATION, WITHOUT LONG-TERM CURRENT USE OF INSULIN: Primary | ICD-10-CM

## 2022-03-04 LAB
ALBUMIN SERPL-MCNC: 4.5 G/DL (ref 3.5–5.2)
ALBUMIN/GLOB SERPL: 2 G/DL
ALP SERPL-CCNC: 116 U/L (ref 39–117)
ALT SERPL W P-5'-P-CCNC: 25 U/L (ref 1–41)
ANION GAP SERPL CALCULATED.3IONS-SCNC: 11.8 MMOL/L (ref 5–15)
AST SERPL-CCNC: 15 U/L (ref 1–40)
BASOPHILS # BLD AUTO: 0.11 10*3/MM3 (ref 0–0.2)
BASOPHILS NFR BLD AUTO: 1.2 % (ref 0–1.5)
BILIRUB SERPL-MCNC: 0.5 MG/DL (ref 0–1.2)
BILIRUB UR QL STRIP: NEGATIVE
BUN SERPL-MCNC: 9 MG/DL (ref 8–23)
BUN/CREAT SERPL: 10.1 (ref 7–25)
CALCIUM SPEC-SCNC: 9.9 MG/DL (ref 8.6–10.5)
CHLORIDE SERPL-SCNC: 106 MMOL/L (ref 98–107)
CHOLEST SERPL-MCNC: 138 MG/DL (ref 0–200)
CLARITY UR: CLEAR
CO2 SERPL-SCNC: 24.2 MMOL/L (ref 22–29)
COLOR UR: YELLOW
CREAT SERPL-MCNC: 0.89 MG/DL (ref 0.76–1.27)
DEPRECATED RDW RBC AUTO: 42.6 FL (ref 37–54)
EGFRCR SERPLBLD CKD-EPI 2021: 92.2 ML/MIN/1.73
EOSINOPHIL # BLD AUTO: 0.44 10*3/MM3 (ref 0–0.4)
EOSINOPHIL NFR BLD AUTO: 4.8 % (ref 0.3–6.2)
ERYTHROCYTE [DISTWIDTH] IN BLOOD BY AUTOMATED COUNT: 12.6 % (ref 12.3–15.4)
GLOBULIN UR ELPH-MCNC: 2.3 GM/DL
GLUCOSE SERPL-MCNC: 118 MG/DL (ref 65–99)
GLUCOSE UR STRIP-MCNC: ABNORMAL MG/DL
HBA1C MFR BLD: 6.3 % (ref 4.8–5.6)
HCT VFR BLD AUTO: 51.5 % (ref 37.5–51)
HDLC SERPL-MCNC: 38 MG/DL (ref 40–60)
HGB BLD-MCNC: 17.7 G/DL (ref 13–17.7)
HGB UR QL STRIP.AUTO: NEGATIVE
IMM GRANULOCYTES # BLD AUTO: 0.04 10*3/MM3 (ref 0–0.05)
IMM GRANULOCYTES NFR BLD AUTO: 0.4 % (ref 0–0.5)
KETONES UR QL STRIP: NEGATIVE
LDLC SERPL CALC-MCNC: 68 MG/DL (ref 0–100)
LDLC/HDLC SERPL: 1.63 {RATIO}
LEUKOCYTE ESTERASE UR QL STRIP.AUTO: NEGATIVE
LYMPHOCYTES # BLD AUTO: 3.96 10*3/MM3 (ref 0.7–3.1)
LYMPHOCYTES NFR BLD AUTO: 43.2 % (ref 19.6–45.3)
MCH RBC QN AUTO: 31.7 PG (ref 26.6–33)
MCHC RBC AUTO-ENTMCNC: 34.4 G/DL (ref 31.5–35.7)
MCV RBC AUTO: 92.1 FL (ref 79–97)
MONOCYTES # BLD AUTO: 0.77 10*3/MM3 (ref 0.1–0.9)
MONOCYTES NFR BLD AUTO: 8.4 % (ref 5–12)
NEUTROPHILS NFR BLD AUTO: 3.85 10*3/MM3 (ref 1.7–7)
NEUTROPHILS NFR BLD AUTO: 42 % (ref 42.7–76)
NITRITE UR QL STRIP: NEGATIVE
NRBC BLD AUTO-RTO: 0 /100 WBC (ref 0–0.2)
PH UR STRIP.AUTO: 5.5 [PH] (ref 5–8)
PLATELET # BLD AUTO: 266 10*3/MM3 (ref 140–450)
PMV BLD AUTO: 10 FL (ref 6–12)
POTASSIUM SERPL-SCNC: 4.5 MMOL/L (ref 3.5–5.2)
PROT SERPL-MCNC: 6.8 G/DL (ref 6–8.5)
PROT UR QL STRIP: NEGATIVE
RBC # BLD AUTO: 5.59 10*6/MM3 (ref 4.14–5.8)
SODIUM SERPL-SCNC: 142 MMOL/L (ref 136–145)
SP GR UR STRIP: >=1.03 (ref 1–1.03)
TRIGL SERPL-MCNC: 191 MG/DL (ref 0–150)
UROBILINOGEN UR QL STRIP: ABNORMAL
VLDLC SERPL-MCNC: 32 MG/DL (ref 5–40)
WBC NRBC COR # BLD: 9.17 10*3/MM3 (ref 3.4–10.8)

## 2022-03-04 PROCEDURE — 99214 OFFICE O/P EST MOD 30 MIN: CPT | Performed by: NURSE PRACTITIONER

## 2022-03-04 PROCEDURE — 81003 URINALYSIS AUTO W/O SCOPE: CPT | Performed by: NURSE PRACTITIONER

## 2022-03-04 PROCEDURE — 80053 COMPREHEN METABOLIC PANEL: CPT | Performed by: NURSE PRACTITIONER

## 2022-03-04 PROCEDURE — 80061 LIPID PANEL: CPT | Performed by: NURSE PRACTITIONER

## 2022-03-04 PROCEDURE — 85025 COMPLETE CBC W/AUTO DIFF WBC: CPT | Performed by: NURSE PRACTITIONER

## 2022-03-04 PROCEDURE — 83036 HEMOGLOBIN GLYCOSYLATED A1C: CPT | Performed by: NURSE PRACTITIONER

## 2022-03-04 RX ORDER — ATORVASTATIN CALCIUM 40 MG/1
40 TABLET, FILM COATED ORAL DAILY
Qty: 90 TABLET | Refills: 1 | Status: SHIPPED | OUTPATIENT
Start: 2022-03-04 | End: 2022-09-12 | Stop reason: SDUPTHER

## 2022-03-04 RX ORDER — PROPRANOLOL HYDROCHLORIDE 20 MG/1
20 TABLET ORAL 2 TIMES DAILY
Qty: 180 TABLET | Refills: 1 | Status: SHIPPED | OUTPATIENT
Start: 2022-03-04 | End: 2022-09-12 | Stop reason: SDUPTHER

## 2022-03-04 RX ORDER — DAPAGLIFLOZIN 10 MG/1
1 TABLET, FILM COATED ORAL DAILY
Qty: 90 TABLET | Refills: 1 | Status: SHIPPED | OUTPATIENT
Start: 2022-03-04 | End: 2022-09-12

## 2022-03-04 NOTE — PROGRESS NOTES
Chief Complaint  Hyperlipidemia (refills and labs, patient is fasting) and Diabetes    PHQ-2 Total Score: 0    Subjective        Past Medical History:   Diagnosis Date   • Arthritis     HANDS AND SHOULDER   • Coronary artery disease    • Diabetes mellitus (HCC)    • Essential tremor     right hand    • Hypercholesteremia    • Hypertension    • ST elevation myocardial infarction (STEMI) (HCC)      Social History     Tobacco Use   • Smoking status: Former Smoker     Packs/day: 1.00     Quit date: 3/4/2000     Years since quittin.0   • Smokeless tobacco: Never Used   • Tobacco comment: Daily caffeine use   Vaping Use   • Vaping Use: Never used   Substance Use Topics   • Alcohol use: Yes     Comment: occ   • Drug use: No      Current Outpatient Medications on File Prior to Visit   Medication Sig   • aspirin 81 MG tablet Take  by mouth Daily.   • [DISCONTINUED] atorvastatin (LIPITOR) 40 MG tablet TAKE 1 TABLET BY MOUTH DAILY   • [DISCONTINUED] Farxiga 10 MG tablet TAKE 1 TABLET BY MOUTH DAILY   • [DISCONTINUED] metFORMIN (GLUCOPHAGE) 500 MG tablet TAKE 1 TABLET BY MOUTH TWICE DAILY WITH MEALS   • [DISCONTINUED] propranolol (INDERAL) 20 MG tablet TAKE 1 TABLET BY MOUTH TWICE DAILY   • Sodium Sulfate-Mag Sulfate-KCl (Sutab) 5801-962-138 MG tablet Take 12 tablets by mouth Daily. BIN: [id number]. PCN: SANTOSH. GROUP: [id number]. MEMBER ID: [id number]     No current facility-administered medications on file prior to visit.      Allergies   Allergen Reactions   • Celecoxib Unknown - Low Severity   • Metaxalone Unknown - Low Severity      Health Maintenance Due   Topic Date Due   • ANNUAL PHYSICAL  Never done   • ZOSTER VACCINE (1 of 2) Never done   • DIABETIC EYE EXAM  Never done   • Pneumococcal Vaccine 65+ (2 of 2 - PPSV23) 10/02/2020   • HEMOGLOBIN A1C  2022      Twin MARY Babcock presents to [facility]  Here to follow up on chronic conditions and obtain refills of medication. Pt is  "fasting today for labs. Retired in Sept 2021. Hobbies including painting and building wooden ships    Tremor: he takes propranolol twice daily due to tremor of hands.     Hyperlipidemia: denies muscle aches or cramps.    DM: does not monitor glucose. Pt is following a fairly low salt and low carb diet. He was seen by eye ([facility] works in [employer]) last year of the right eye. He is blind in the left eye. Denies numbness or tingling of the feet.       Objective   Vital Signs:   /68 (BP Location: Left arm)   Pulse 66   Temp 97.3 °F (36.3 °C)   Ht 177.8 cm (70\")   Wt 78.9 kg (174 lb)   SpO2 97%   BMI 24.97 kg/m²     Review of Systems   Respiratory: Negative for shortness of breath.    Cardiovascular: Negative for chest pain and palpitations.      Physical Exam  Vitals reviewed.   Constitutional:       General: He is not in acute distress.     Appearance: Normal appearance. He is well-developed.   HENT:      Head: Normocephalic and atraumatic.   Eyes:      Conjunctiva/sclera: Conjunctivae normal.   Cardiovascular:      Rate and Rhythm: Normal rate and regular rhythm.      Heart sounds: Normal heart sounds.   Pulmonary:      Effort: Pulmonary effort is normal.      Breath sounds: Normal breath sounds.   Musculoskeletal:      Cervical back: Neck supple.      Right lower leg: No edema.      Left lower leg: No edema.   Skin:     General: Skin is warm and dry.   Neurological:      Mental Status: He is alert and oriented to person, place, and time.      Cranial Nerves: No cranial nerve deficit.   Psychiatric:         Mood and Affect: Mood and affect normal.         Behavior: Behavior normal.         Thought Content: Thought content normal.         Judgment: Judgment normal.        Result Review :   The following data was reviewed by: MARIANO Irving on 03/04/2022:  Common labs    Common Labsle 4/8/21 4/8/21 4/8/21 4/8/21 4/8/21 8/2/21 8/2/21 8/2/21 8/2/21 8/2/21 8/2/21    1200 1200 1200 1200 1200 1417 1417 1417 " 1417 1417 1417   Glucose   340 (A)     97      BUN   13     11      Creatinine   0.99     0.83      eGFR Non  Am        92      Sodium   136     139      Potassium   4.4     4.2      Chloride   104     103      Calcium   9.4     10.1      Albumin   4.0     4.50      Total Bilirubin   0.66     0.6      Alkaline Phosphatase   131     115      AST (SGOT)   16     16      ALT (SGPT)   23     21      WBC 8.27     7.94        Hemoglobin 16.2     17.7        Hematocrit 48.3     51.9 (A)        Platelets 184     276        Total Cholesterol         131     Total Cholesterol    156          Triglycerides    453 (A)     103     HDL Cholesterol    30 (A)     35 (A)     LDL Cholesterol      67 (A)    77     Hemoglobin A1C  11.0 (A)     6.05 (A)       Microalbumin, Urine           <1.2   PSA          3.210    (A) Abnormal value       Comments are available for some flowsheets but are not being displayed.                     Assessment and Plan    Diagnoses and all orders for this visit:    1. Type 2 diabetes mellitus without complication, without long-term current use of insulin ([facility]) (Primary)  -     Urinalysis With Culture If Indicated - Urine, Clean Catch  -     CBC & Differential  -     Comprehensive Metabolic Panel  -     Hemoglobin A1c  -     Lipid Panel  -     Dapagliflozin Propanediol (Farxiga) 10 MG tablet; Take 10 mg by mouth Daily.  Dispense: 90 tablet; Refill: 1  -     metFORMIN (GLUCOPHAGE) 500 MG tablet; Take 1 tablet by mouth 2 (Two) Times a Day With Meals.  Dispense: 180 tablet; Refill: 1    2. Hyperlipidemia, unspecified hyperlipidemia type  -     atorvastatin (LIPITOR) 40 MG tablet; Take 1 tablet by mouth Daily.  Dispense: 90 tablet; Refill: 1    3. Tremor of right hand  -     propranolol (INDERAL) 20 MG tablet; Take 1 tablet by mouth 2 (Two) Times a Day.  Dispense: 180 tablet; Refill: 1      Discussed with patient that if his A1c is normal then we will have him stop the Farxiga and repeat A1c in 3  months, at that time if A1c is still in we will have him continue to take Metformin 1 tablet twice daily if A1c has elevated slightly then will have him increase Metformin to 2 tablets twice daily.  Patient states understanding with plan of care.  Follow Up   Return in about 6 months (around 9/4/2022) for Refills and fasting labs.  Patient was given instructions and counseling regarding his condition or for health maintenance advice. Please see specific information pulled into the AVS if appropriate.

## 2022-03-04 NOTE — PROGRESS NOTES
Venipuncture Blood Specimen Collection  Venipuncture performed in left arm  by Sulma Pang with good hemostasis. Patient tolerated the procedure well without complications.   03/04/22   Sulma Pang

## 2022-04-02 DIAGNOSIS — E11.9 TYPE 2 DIABETES MELLITUS WITHOUT COMPLICATION, WITHOUT LONG-TERM CURRENT USE OF INSULIN: ICD-10-CM

## 2022-05-02 ENCOUNTER — TELEPHONE (OUTPATIENT)
Dept: CARDIOLOGY | Facility: CLINIC | Age: 72
End: 2022-05-02

## 2022-05-28 DIAGNOSIS — R25.1 TREMOR OF RIGHT HAND: ICD-10-CM

## 2022-05-31 RX ORDER — PROPRANOLOL HYDROCHLORIDE 20 MG/1
TABLET ORAL
Qty: 180 TABLET | Refills: 1 | OUTPATIENT
Start: 2022-05-31

## 2022-06-03 ENCOUNTER — CLINICAL SUPPORT (OUTPATIENT)
Dept: FAMILY MEDICINE CLINIC | Facility: CLINIC | Age: 72
End: 2022-06-03

## 2022-06-03 DIAGNOSIS — E11.9 TYPE 2 DIABETES MELLITUS WITHOUT COMPLICATION, WITHOUT LONG-TERM CURRENT USE OF INSULIN: ICD-10-CM

## 2022-06-03 LAB — HBA1C MFR BLD: 6.6 % (ref 4.8–5.6)

## 2022-06-03 PROCEDURE — 83036 HEMOGLOBIN GLYCOSYLATED A1C: CPT | Performed by: NURSE PRACTITIONER

## 2022-06-03 PROCEDURE — 36415 COLL VENOUS BLD VENIPUNCTURE: CPT | Performed by: NURSE PRACTITIONER

## 2022-06-03 NOTE — PROGRESS NOTES
..  Venipuncture Blood Specimen Collection  Venipuncture performed in left arm by Alexandrea Malone with good hemostasis. Patient tolerated the procedure well without complications.   06/03/22   Alexandrea Malone

## 2022-06-04 DIAGNOSIS — E11.9 TYPE 2 DIABETES MELLITUS WITHOUT COMPLICATION, WITHOUT LONG-TERM CURRENT USE OF INSULIN: ICD-10-CM

## 2022-08-18 ENCOUNTER — OFFICE VISIT (OUTPATIENT)
Dept: FAMILY MEDICINE CLINIC | Facility: CLINIC | Age: 72
End: 2022-08-18

## 2022-08-18 VITALS
WEIGHT: 178 LBS | HEART RATE: 56 BPM | TEMPERATURE: 98.1 F | OXYGEN SATURATION: 96 % | BODY MASS INDEX: 25.48 KG/M2 | DIASTOLIC BLOOD PRESSURE: 70 MMHG | HEIGHT: 70 IN | SYSTOLIC BLOOD PRESSURE: 110 MMHG

## 2022-08-18 DIAGNOSIS — E11.9 ENCOUNTER FOR DIABETIC FOOT EXAM: ICD-10-CM

## 2022-08-18 DIAGNOSIS — Z00.00 ENCOUNTER FOR ANNUAL WELLNESS EXAM IN MEDICARE PATIENT: Primary | ICD-10-CM

## 2022-08-18 PROCEDURE — G0439 PPPS, SUBSEQ VISIT: HCPCS | Performed by: NURSE PRACTITIONER

## 2022-08-18 PROCEDURE — 1160F RVW MEDS BY RX/DR IN RCRD: CPT | Performed by: NURSE PRACTITIONER

## 2022-08-18 PROCEDURE — 1170F FXNL STATUS ASSESSED: CPT | Performed by: NURSE PRACTITIONER

## 2022-08-18 NOTE — PROGRESS NOTES
The ABCs of the Annual Wellness Visit  Subsequent Medicare Wellness Visit    Chief Complaint   Patient presents with   • Medicare Wellness-subsequent     Annual wellness      Subjective    History of Present Illness:  Twin Babcock is a 72 y.o. male who presents for a Subsequent Medicare Wellness Visit.    The following portions of the patient's history were reviewed and   updated as appropriate: allergies, current medications, past family history, past medical history, past social history, past surgical history and problem list.    Compared to one year ago, the patient feels his physical   health is the same.    Compared to one year ago, the patient feels his mental   health is the same.    Pt states he does not have a hx of HTN but is on propranolol due to a tremor of the hand.     DM: last eye exam was in [facility]. [place]        Recent Hospitalizations:  He was not admitted to the hospital during the last year.       Current Medical Providers:  Patient Care Team:  Debbie Christopher APRN as PCP - General (Nurse Practitioner)    Outpatient Medications Prior to Visit   Medication Sig Dispense Refill   • aspirin 81 MG tablet Take  by mouth Daily.     • atorvastatin (LIPITOR) 40 MG tablet Take 1 tablet by mouth Daily. 90 tablet 1   • metFORMIN (GLUCOPHAGE) 500 MG tablet Take 2 tablets by mouth 2 (Two) Times a Day With Meals. 360 tablet 0   • propranolol (INDERAL) 20 MG tablet Take 1 tablet by mouth 2 (Two) Times a Day. 180 tablet 1   • Dapagliflozin Propanediol (Farxiga) 10 MG tablet Take 10 mg by mouth Daily. 90 tablet 1   • Sodium Sulfate-Mag Sulfate-KCl (Sutab) 2062-079-029 MG tablet Take 12 tablets by mouth Daily. BIN: [id number]. PCN: SANTOSH. GROUP: [id number]. MEMBER ID: [id number] 24 tablet 0     No facility-administered medications prior to visit.       No opioid medication identified on active medication list. I have reviewed chart for other potential  high risk medication/s and harmful drug interactions in the  "elderly.          Aspirin is on active medication list. Aspirin use is indicated based on review of current medical condition/s. Pros and cons of this therapy have been discussed today. Benefits of this medication outweigh potential harm.  Patient has been encouraged to continue taking this medication.  .      Patient Active Problem List   Diagnosis   • MI (myocardial infarction) (HCC)   • Coronary artery disease involving native coronary artery of native heart without angina pectoris   • Hyperlipidemia   • Tremor of right hand   • Transient elevated blood pressure   • Bursitis of right shoulder   • Cataract   • Diabetes mellitus, type 2 (HCC)   • Impaired glucose tolerance   • Impingement syndrome of right shoulder   • Osteoarthritis   • Tremor   • Screen for colon cancer     Advance Care Planning  Advance Directive is not on file.  ACP discussion was held with the patient during this visit. Patient does not have an advance directive, information provided.          Objective    Vitals:    08/18/22 1107   BP: 110/70   BP Location: Left arm   Pulse: 56   Temp: 98.1 °F (36.7 °C)   SpO2: 96%   Weight: 80.7 kg (178 lb)   Height: 177.8 cm (70\")     Estimated body mass index is 25.54 kg/m² as calculated from the following:    Height as of this encounter: 177.8 cm (70\").    Weight as of this encounter: 80.7 kg (178 lb).    BMI is >= 25 and <30. (Overweight) The following options were offered after discussion;: weight loss educational material (shared in after visit summary)      Does the patient have evidence of cognitive impairment? No    Physical Exam  Vitals reviewed.   Constitutional:       General: He is not in acute distress.     Appearance: Normal appearance. He is well-developed.   Eyes:      Conjunctiva/sclera: Conjunctivae normal.      Pupils: Pupils are equal, round, and reactive to light.   Cardiovascular:      Rate and Rhythm: Normal rate and regular rhythm.      Pulses:           Dorsalis pedis pulses are 2+ " on the right side and 2+ on the left side.      Heart sounds: Normal heart sounds.   Pulmonary:      Effort: Pulmonary effort is normal.      Breath sounds: Normal breath sounds.   Musculoskeletal:      Cervical back: Neck supple.      Right lower leg: No edema.      Left lower leg: No edema.      Right foot: No bunion.      Left foot: No bunion.   Feet:      Right foot:      Protective Sensation: 9 sites tested. 9 sites sensed.      Skin integrity: No ulcer, blister or callus.      Toenail Condition: Right toenails are normal.      Left foot:      Protective Sensation: 9 sites tested. 9 sites sensed.      Skin integrity: Skin integrity normal. No ulcer, blister or callus.      Toenail Condition: Left toenails are normal.      Comments:      Skin:     General: Skin is warm and dry.   Neurological:      Mental Status: He is alert and oriented to person, place, and time.   Psychiatric:         Mood and Affect: Mood and affect normal.         Behavior: Behavior normal.         Thought Content: Thought content normal.         Judgment: Judgment normal.       Lab Results   Component Value Date    HGBA1C 6.60 (H) 2022            HEALTH RISK ASSESSMENT    Smoking Status:  Social History     Tobacco Use   Smoking Status Former Smoker   • Packs/day: 1.00   • Quit date: 3/4/2000   • Years since quittin.4   Smokeless Tobacco Never Used   Tobacco Comment    Daily caffeine use     Alcohol Consumption:  Social History     Substance and Sexual Activity   Alcohol Use Yes    Comment: occ     Fall Risk Screen:    STEADI Fall Risk Assessment was completed, and patient is at LOW risk for falls.Assessment completed on:2022    Depression Screening:  PHQ-2/PHQ-9 Depression Screening 2022   Retired PHQ-9 Total Score -   Retired Total Score -   Little Interest or Pleasure in Doing Things 0-->not at all   Feeling Down, Depressed or Hopeless 0-->not at all   PHQ-9: Brief Depression Severity Measure Score 0       Health  Habits and Functional and Cognitive Screening:  Functional & Cognitive Status 8/18/2022   Do you have difficulty preparing food and eating? No   Do you have difficulty bathing yourself, getting dressed or grooming yourself? No   Do you have difficulty using the toilet? No   Do you have difficulty moving around from place to place? No   Do you have trouble with steps or getting out of a bed or a chair? No   Current Diet Diabetic Diet   Dental Exam Up to date   Eye Exam Up to date   Exercise (times per week) 3 times per week   Current Exercises Include Yard Work   Do you need help using the phone?  No   Are you deaf or do you have serious difficulty hearing?  Yes   Do you need help with transportation? No   Do you need help shopping? No   Do you need help preparing meals?  No   Do you need help with housework?  No   Do you need help with laundry? No   Do you need help taking your medications? No   Do you need help managing money? No   Do you ever drive or ride in a car without wearing a seat belt? No   Have you felt unusual stress, anger or loneliness in the last month? No   Who do you live with? Spouse   If you need help, do you have trouble finding someone available to you? No   Have you been bothered in the last four weeks by sexual problems? No   Do you have difficulty concentrating, remembering or making decisions? No       Age-appropriate Screening Schedule:  Refer to the list below for future screening recommendations based on patient's age, sex and/or medical conditions. Orders for these recommended tests are listed in the plan section. The patient has been provided with a written plan.    Health Maintenance   Topic Date Due   • ZOSTER VACCINE (1 of 2) Never done   • DIABETIC EYE EXAM  Never done   • URINE MICROALBUMIN  08/02/2022   • INFLUENZA VACCINE  10/01/2022   • HEMOGLOBIN A1C  12/03/2022   • LIPID PANEL  03/04/2023   • DIABETIC FOOT EXAM  08/18/2023   • TDAP/TD VACCINES (2 - Td or Tdap) 10/02/2025               Assessment & Plan   CMS Preventative Services Quick Reference  Risk Factors Identified During Encounter  Cardiovascular Disease  Immunizations Discussed/Encouraged (specific Immunizations; Prevnar 20 (Pneumococcal 20-valent conjugate), Shingrix and COVID19  The above risks/problems have been discussed with the patient.  Follow up actions/plans if indicated are seen below in the Assessment/Plan Section.  Pertinent information has been shared with the patient in the After Visit Summary.    Diagnoses and all orders for this visit:    1. Encounter for annual wellness exam in Medicare patient (Primary)  -     Abdominal Aortic Aneurysm Screening Medicare CAR; Future    2. Encounter for diabetic foot exam (HCC)        Follow Up:   Return in about 3 months (around 11/18/2022) for Refills and fasting labs.     An After Visit Summary and PPPS were made available to the patient.

## 2022-08-28 DIAGNOSIS — R25.1 TREMOR OF RIGHT HAND: ICD-10-CM

## 2022-08-28 DIAGNOSIS — E78.5 HYPERLIPIDEMIA, UNSPECIFIED HYPERLIPIDEMIA TYPE: ICD-10-CM

## 2022-08-28 DIAGNOSIS — E11.9 TYPE 2 DIABETES MELLITUS WITHOUT COMPLICATION, WITHOUT LONG-TERM CURRENT USE OF INSULIN: ICD-10-CM

## 2022-08-29 RX ORDER — PROPRANOLOL HYDROCHLORIDE 20 MG/1
TABLET ORAL
Qty: 180 TABLET | Refills: 1 | OUTPATIENT
Start: 2022-08-29

## 2022-08-29 RX ORDER — ATORVASTATIN CALCIUM 40 MG/1
40 TABLET, FILM COATED ORAL DAILY
Qty: 90 TABLET | Refills: 1 | OUTPATIENT
Start: 2022-08-29

## 2022-09-04 DIAGNOSIS — R25.1 TREMOR OF RIGHT HAND: ICD-10-CM

## 2022-09-06 RX ORDER — PROPRANOLOL HYDROCHLORIDE 20 MG/1
TABLET ORAL
Qty: 180 TABLET | Refills: 1 | OUTPATIENT
Start: 2022-09-06

## 2022-09-09 ENCOUNTER — TELEPHONE (OUTPATIENT)
Dept: FAMILY MEDICINE CLINIC | Facility: CLINIC | Age: 72
End: 2022-09-09

## 2022-09-09 DIAGNOSIS — I25.10 CORONARY ARTERY DISEASE INVOLVING NATIVE CORONARY ARTERY OF NATIVE HEART WITHOUT ANGINA PECTORIS: ICD-10-CM

## 2022-09-09 DIAGNOSIS — I21.11 MYOCARDIAL INFARCTION INVOLVING RIGHT CORONARY ARTERY, UNSPECIFIED MI TYPE: ICD-10-CM

## 2022-09-09 DIAGNOSIS — E11.9 TYPE 2 DIABETES MELLITUS WITHOUT COMPLICATION, WITHOUT LONG-TERM CURRENT USE OF INSULIN: Primary | ICD-10-CM

## 2022-09-09 NOTE — TELEPHONE ENCOUNTER
Caller: Twin Babcock    Relationship: Self    Best call back number:     [phone]      What orders are you requesting (i.e. lab or imaging): BLOOD WORK    In what timeframe would the patient need to come in: ASAP       Additional notes:      THE PATIENT WOULD LIKE AN ORDER FOR LABS. HE IS REQUESTING THIS TO BE DONE ASAP BEFORE HIS APPOINTMENT ON Monday. PATIENT WOULD LIKE A MESSAGE LEFT IF HE DO NOT ANSWER THE PHONE SAID HE IS HAVING A HARD TIME HEARING THE RING       PATIENT WOULD LIKE A  CALL ONCE THIS HAS BEEN DONE

## 2022-09-09 NOTE — TELEPHONE ENCOUNTER
Patient called because the Propanalol was denied because he needs an appointment. He was here last month for a Medicare Physical, I told him I thought his labs were due and he said you told him October or November. Can you put in orders for labs or does he need to come back for an appointment?

## 2022-09-09 NOTE — TELEPHONE ENCOUNTER
Caller: Twin Babcock    Relationship: Self    Best call back number: [phone]      What is the medical concern/diagnosis: HEART ISSUES     What specialty or service is being requested: CARDIOLOGIST    What is the provider, practice or medical service name:                Any additional details:       THE PATIENT WOULD LIKE A REFRRAL TO THE HEART DOCTOR. HE SAID HE USED TO GO TO ONE AND HAS NOT BEEN SINCE COVID. HE SAID THE DOCTOR PUT IN SPLINTS AND HE WOULD LIKE TO GO BACK TO CONTINUE SEEING A SPECIALIST FOR HIS HEART HEALTH .      PATIENT WOULD LIKE A MESSAGE LEFT IF HE DO NOT ANSWER THE PHONE SAID HE IS HAVING A HARD TIME HEARING THE RING      PLEASE ADVISE PATIENT

## 2022-09-12 ENCOUNTER — OFFICE VISIT (OUTPATIENT)
Dept: FAMILY MEDICINE CLINIC | Facility: CLINIC | Age: 72
End: 2022-09-12

## 2022-09-12 VITALS
OXYGEN SATURATION: 97 % | DIASTOLIC BLOOD PRESSURE: 70 MMHG | SYSTOLIC BLOOD PRESSURE: 114 MMHG | HEIGHT: 70 IN | BODY MASS INDEX: 24.91 KG/M2 | TEMPERATURE: 97.3 F | HEART RATE: 64 BPM | WEIGHT: 174 LBS

## 2022-09-12 DIAGNOSIS — E78.5 HYPERLIPIDEMIA, UNSPECIFIED HYPERLIPIDEMIA TYPE: ICD-10-CM

## 2022-09-12 DIAGNOSIS — Z00.00 ENCOUNTER FOR ANNUAL WELLNESS EXAM IN MEDICARE PATIENT: ICD-10-CM

## 2022-09-12 DIAGNOSIS — R25.1 TREMOR OF RIGHT HAND: ICD-10-CM

## 2022-09-12 DIAGNOSIS — R06.09 DOE (DYSPNEA ON EXERTION): ICD-10-CM

## 2022-09-12 DIAGNOSIS — I25.10 CORONARY ARTERY DISEASE INVOLVING NATIVE CORONARY ARTERY OF NATIVE HEART WITHOUT ANGINA PECTORIS: ICD-10-CM

## 2022-09-12 DIAGNOSIS — E11.9 TYPE 2 DIABETES MELLITUS WITHOUT COMPLICATION, WITHOUT LONG-TERM CURRENT USE OF INSULIN: Primary | ICD-10-CM

## 2022-09-12 LAB
ALBUMIN SERPL-MCNC: 4.5 G/DL (ref 3.5–5.2)
ALBUMIN/GLOB SERPL: 2.1 G/DL
ALP SERPL-CCNC: 109 U/L (ref 39–117)
ALT SERPL W P-5'-P-CCNC: 22 U/L (ref 1–41)
ANION GAP SERPL CALCULATED.3IONS-SCNC: 14 MMOL/L (ref 5–15)
AST SERPL-CCNC: 18 U/L (ref 1–40)
BASOPHILS # BLD AUTO: 0.13 10*3/MM3 (ref 0–0.2)
BASOPHILS NFR BLD AUTO: 1.6 % (ref 0–1.5)
BILIRUB SERPL-MCNC: 0.5 MG/DL (ref 0–1.2)
BUN SERPL-MCNC: 13 MG/DL (ref 8–23)
BUN/CREAT SERPL: 16 (ref 7–25)
CALCIUM SPEC-SCNC: 10.2 MG/DL (ref 8.6–10.5)
CHLORIDE SERPL-SCNC: 105 MMOL/L (ref 98–107)
CHOLEST SERPL-MCNC: 124 MG/DL (ref 0–200)
CO2 SERPL-SCNC: 20 MMOL/L (ref 22–29)
CREAT SERPL-MCNC: 0.81 MG/DL (ref 0.76–1.27)
DEPRECATED RDW RBC AUTO: 40 FL (ref 37–54)
EGFRCR SERPLBLD CKD-EPI 2021: 94.3 ML/MIN/1.73
EOSINOPHIL # BLD AUTO: 0.37 10*3/MM3 (ref 0–0.4)
EOSINOPHIL NFR BLD AUTO: 4.4 % (ref 0.3–6.2)
ERYTHROCYTE [DISTWIDTH] IN BLOOD BY AUTOMATED COUNT: 12.3 % (ref 12.3–15.4)
GLOBULIN UR ELPH-MCNC: 2.1 GM/DL
GLUCOSE SERPL-MCNC: 127 MG/DL (ref 65–99)
HBA1C MFR BLD: 6.2 % (ref 4.8–5.6)
HCT VFR BLD AUTO: 45.4 % (ref 37.5–51)
HDLC SERPL-MCNC: 36 MG/DL (ref 40–60)
HGB BLD-MCNC: 15.8 G/DL (ref 13–17.7)
IMM GRANULOCYTES # BLD AUTO: 0.03 10*3/MM3 (ref 0–0.05)
IMM GRANULOCYTES NFR BLD AUTO: 0.4 % (ref 0–0.5)
LDLC SERPL CALC-MCNC: 57 MG/DL (ref 0–100)
LDLC/HDLC SERPL: 1.42 {RATIO}
LYMPHOCYTES # BLD AUTO: 3.3 10*3/MM3 (ref 0.7–3.1)
LYMPHOCYTES NFR BLD AUTO: 39.5 % (ref 19.6–45.3)
MCH RBC QN AUTO: 31.7 PG (ref 26.6–33)
MCHC RBC AUTO-ENTMCNC: 34.8 G/DL (ref 31.5–35.7)
MCV RBC AUTO: 91.2 FL (ref 79–97)
MONOCYTES # BLD AUTO: 0.81 10*3/MM3 (ref 0.1–0.9)
MONOCYTES NFR BLD AUTO: 9.7 % (ref 5–12)
NEUTROPHILS NFR BLD AUTO: 3.71 10*3/MM3 (ref 1.7–7)
NEUTROPHILS NFR BLD AUTO: 44.4 % (ref 42.7–76)
NRBC BLD AUTO-RTO: 0.1 /100 WBC (ref 0–0.2)
PLATELET # BLD AUTO: 268 10*3/MM3 (ref 140–450)
PMV BLD AUTO: 10.5 FL (ref 6–12)
POTASSIUM SERPL-SCNC: 4.5 MMOL/L (ref 3.5–5.2)
PROT SERPL-MCNC: 6.6 G/DL (ref 6–8.5)
RBC # BLD AUTO: 4.98 10*6/MM3 (ref 4.14–5.8)
SODIUM SERPL-SCNC: 139 MMOL/L (ref 136–145)
TRIGL SERPL-MCNC: 185 MG/DL (ref 0–150)
TSH SERPL DL<=0.05 MIU/L-ACNC: 2.67 UIU/ML (ref 0.27–4.2)
VLDLC SERPL-MCNC: 31 MG/DL (ref 5–40)
WBC NRBC COR # BLD: 8.35 10*3/MM3 (ref 3.4–10.8)

## 2022-09-12 PROCEDURE — 84443 ASSAY THYROID STIM HORMONE: CPT | Performed by: NURSE PRACTITIONER

## 2022-09-12 PROCEDURE — 80053 COMPREHEN METABOLIC PANEL: CPT | Performed by: NURSE PRACTITIONER

## 2022-09-12 PROCEDURE — 87086 URINE CULTURE/COLONY COUNT: CPT | Performed by: NURSE PRACTITIONER

## 2022-09-12 PROCEDURE — 85025 COMPLETE CBC W/AUTO DIFF WBC: CPT | Performed by: NURSE PRACTITIONER

## 2022-09-12 PROCEDURE — 87186 SC STD MICRODIL/AGAR DIL: CPT | Performed by: NURSE PRACTITIONER

## 2022-09-12 PROCEDURE — 81001 URINALYSIS AUTO W/SCOPE: CPT | Performed by: NURSE PRACTITIONER

## 2022-09-12 PROCEDURE — 82043 UR ALBUMIN QUANTITATIVE: CPT | Performed by: NURSE PRACTITIONER

## 2022-09-12 PROCEDURE — 80061 LIPID PANEL: CPT | Performed by: NURSE PRACTITIONER

## 2022-09-12 PROCEDURE — 93000 ELECTROCARDIOGRAM COMPLETE: CPT | Performed by: NURSE PRACTITIONER

## 2022-09-12 PROCEDURE — 99214 OFFICE O/P EST MOD 30 MIN: CPT | Performed by: NURSE PRACTITIONER

## 2022-09-12 PROCEDURE — 83036 HEMOGLOBIN GLYCOSYLATED A1C: CPT | Performed by: NURSE PRACTITIONER

## 2022-09-12 RX ORDER — ATORVASTATIN CALCIUM 40 MG/1
40 TABLET, FILM COATED ORAL DAILY
Qty: 90 TABLET | Refills: 1 | Status: SHIPPED | OUTPATIENT
Start: 2022-09-12 | End: 2023-03-14 | Stop reason: SDUPTHER

## 2022-09-12 RX ORDER — PROPRANOLOL HYDROCHLORIDE 20 MG/1
20 TABLET ORAL 2 TIMES DAILY
Qty: 180 TABLET | Refills: 1 | Status: SHIPPED | OUTPATIENT
Start: 2022-09-12 | End: 2023-03-14 | Stop reason: SDUPTHER

## 2022-09-12 NOTE — PROGRESS NOTES
Chief Complaint  Hyperlipidemia (Needs labs and refills, patient is fasting) and Shortness of Breath (While cutting grass last week he became short of breath and heart racing)    Subjective        Past Medical History:   Diagnosis Date   • Arthritis     HANDS AND SHOULDER   • Coronary artery disease    • Diabetes mellitus (HCC)    • Essential tremor     right hand    • Hypercholesteremia    • Hypertension    • ST elevation myocardial infarction (STEMI) (HCC)      Social History     Tobacco Use   • Smoking status: Former Smoker     Packs/day: 1.00     Quit date: 3/4/2000     Years since quittin.5   • Smokeless tobacco: Never Used   • Tobacco comment: Daily caffeine use   Vaping Use   • Vaping Use: Never used   Substance Use Topics   • Alcohol use: Yes     Comment: occ   • Drug use: No      Current Outpatient Medications on File Prior to Visit   Medication Sig   • aspirin 81 MG tablet Take  by mouth Daily.   • [DISCONTINUED] atorvastatin (LIPITOR) 40 MG tablet Take 1 tablet by mouth Daily.   • [DISCONTINUED] metFORMIN (GLUCOPHAGE) 500 MG tablet Take 2 tablets by mouth 2 (Two) Times a Day With Meals.   • [DISCONTINUED] propranolol (INDERAL) 20 MG tablet Take 1 tablet by mouth 2 (Two) Times a Day.   • [DISCONTINUED] Dapagliflozin Propanediol (Farxiga) 10 MG tablet Take 10 mg by mouth Daily.   • [DISCONTINUED] Sodium Sulfate-Mag Sulfate-KCl (Sutab) 9304-551-481 MG tablet Take 12 tablets by mouth Daily. BIN: [id number]. PCN: [id number]. GROUP: [id number]. MEMBER ID: [id number]     No current facility-administered medications on file prior to visit.      Allergies   Allergen Reactions   • Celecoxib Unknown - Low Severity   • Metaxalone Unknown - Low Severity      Health Maintenance Due   Topic Date Due   • ZOSTER VACCINE (1 of 2) Never done   • Pneumococcal Vaccine 65+ (2 - PCV) 10/02/2016   • DIABETIC EYE EXAM  Never done   • COVID-19 Vaccine (3 - Booster for Devyn series) 03/15/2022   • AAA SCREEN (ONE-TIME)  Never done   •  "URINE MICROALBUMIN  08/02/2022      Twin Babcock presents to [facility]  Here to follow up on chronic health conditions.     Pt also notes that the other day while mowing the lawn he had palpitations and bilateral shoulder pain and took awhile to get heart rate down, pt went inside and rested 10-15 minutes. Denies pain into the jaw or down the arms. Pt states when he previously had an MI his arms felt very heavy and fatigued and pain down bilateral arms. Pt did not have means to check HR or BP at the time. Pt also [place] and would have to stop frequently to catch his breath. No upcoming or recent appointment with Cardiology. Pt was previously seen by Dr. Guajardo, stent placed about 5-6 years ago.     DM: taking metformin daily and states he has gotten used to the medication and denies abdominal complaints.     Tremor: stable in office. Pt is currently on propranolol.       Objective   Vital Signs:   /70 (BP Location: Left arm)   Pulse 64   Temp 97.3 °F (36.3 °C)   Ht 177.8 cm (70\")   Wt 78.9 kg (174 lb)   SpO2 97%   BMI 24.97 kg/m²     Review of Systems   Respiratory: Positive for shortness of breath. Negative for cough and wheezing.    Cardiovascular: Positive for palpitations. Negative for chest pain.   Neurological: Negative for dizziness and light-headedness.      Physical Exam  Vitals reviewed.   Constitutional:       General: He is not in acute distress.     Appearance: Normal appearance. He is well-developed.   Eyes:      Conjunctiva/sclera: Conjunctivae normal.      Pupils: Pupils are equal, round, and reactive to light.   Cardiovascular:      Rate and Rhythm: Normal rate and regular rhythm.      Heart sounds: Normal heart sounds.   Pulmonary:      Effort: Pulmonary effort is normal.      Breath sounds: Normal breath sounds.   Musculoskeletal:      Cervical back: Neck supple.      Right lower leg: No edema.      Left " lower leg: No edema.   Skin:     General: Skin is warm and dry.   Neurological:      Mental Status: He is alert and oriented to person, place, and time.   Psychiatric:         Mood and Affect: Mood and affect normal.         Behavior: Behavior normal.         Thought Content: Thought content normal.         Judgment: Judgment normal.        Result Review :   The following data was reviewed by: MARIANO Irving on 09/12/2022:  Common labs    Common Labsle 3/4/22 3/4/22 3/4/22 3/4/22 6/3/22    1123 1123 1123 1123    Glucose  118 (A)      BUN  9      Creatinine  0.89      Sodium  142      Potassium  4.5      Chloride  106      Calcium  9.9      Albumin  4.50      Total Bilirubin  0.5      Alkaline Phosphatase  116      AST (SGOT)  15      ALT (SGPT)  25      WBC 9.17       Hemoglobin 17.7       Hematocrit 51.5 (A)       Platelets 266       Total Cholesterol   138     Triglycerides   191 (A)     HDL Cholesterol   38 (A)     LDL Cholesterol    68     Hemoglobin A1C    6.30 (A) 6.60 (A)   (A) Abnormal value                     ECG 12 Lead    Date/Time: 9/12/2022 9:37 AM  Performed by: Debbie Christopher APRN  Authorized by: Debbie Christopher APRN   Comparison: not compared with previous ECG   Previous ECG: no previous ECG available  Rhythm: sinus rhythm  Rate: bradycardic    Clinical impression: non-specific ECG              Assessment and Plan    Diagnoses and all orders for this visit:    1. Type 2 diabetes mellitus without complication, without long-term current use of insulin (HCC) (Primary)  -     metFORMIN (GLUCOPHAGE) 500 MG tablet; Take 2 tablets by mouth 2 (Two) Times a Day With Meals.  Dispense: 360 tablet; Refill: 1    2. Hyperlipidemia, unspecified hyperlipidemia type  -     atorvastatin (LIPITOR) 40 MG tablet; Take 1 tablet by mouth Daily.  Dispense: 90 tablet; Refill: 1    3. Tremor of right hand  -     propranolol (INDERAL) 20 MG tablet; Take 1 tablet by mouth 2 (Two) Times a Day.  Dispense: 180  tablet; Refill: 1    4. Coronary artery disease involving native coronary artery of native heart without angina pectoris  -     US AAA Screen Limited; Future    5. Encounter for annual wellness exam in Medicare patient  -     US AAA Screen Limited; Future    Other orders  -     ECG 12 Lead        Follow Up   Return in about 6 months (around 3/12/2023) for Refills and fasting labs.  Patient was given instructions and counseling regarding his condition or for health maintenance advice. Please see specific information pulled into the AVS if appropriate.

## 2022-09-12 NOTE — PROGRESS NOTES
Venipuncture Blood Specimen Collection  Venipuncture performed in left arm by Caroline Elena with good hemostasis. Patient tolerated the procedure well without complications.   09/12/22   Caroline Elena

## 2022-09-13 ENCOUNTER — APPOINTMENT (OUTPATIENT)
Dept: CARDIOLOGY | Facility: HOSPITAL | Age: 72
End: 2022-09-13

## 2022-09-13 LAB
ALBUMIN UR-MCNC: <1.2 MG/DL
BACTERIA UR QL AUTO: ABNORMAL /HPF
BILIRUB UR QL STRIP: NEGATIVE
CLARITY UR: CLEAR
COLOR UR: YELLOW
GLUCOSE UR STRIP-MCNC: NEGATIVE MG/DL
HGB UR QL STRIP.AUTO: NEGATIVE
HYALINE CASTS UR QL AUTO: ABNORMAL /LPF
KETONES UR QL STRIP: NEGATIVE
LEUKOCYTE ESTERASE UR QL STRIP.AUTO: ABNORMAL
NITRITE UR QL STRIP: NEGATIVE
PH UR STRIP.AUTO: 5.5 [PH] (ref 5–8)
PROT UR QL STRIP: NEGATIVE
RBC # UR STRIP: ABNORMAL /HPF
REF LAB TEST METHOD: ABNORMAL
SP GR UR STRIP: 1.02 (ref 1–1.03)
SQUAMOUS #/AREA URNS HPF: ABNORMAL /HPF
UROBILINOGEN UR QL STRIP: ABNORMAL
WBC # UR STRIP: ABNORMAL /HPF

## 2022-09-15 DIAGNOSIS — R31.9 URINARY TRACT INFECTION WITH HEMATURIA, SITE UNSPECIFIED: Primary | ICD-10-CM

## 2022-09-15 DIAGNOSIS — N39.0 URINARY TRACT INFECTION WITH HEMATURIA, SITE UNSPECIFIED: Primary | ICD-10-CM

## 2022-09-15 LAB — BACTERIA SPEC AEROBE CULT: ABNORMAL

## 2022-09-15 RX ORDER — LEVOFLOXACIN 500 MG/1
500 TABLET, FILM COATED ORAL DAILY
Qty: 5 TABLET | Refills: 0 | Status: SHIPPED | OUTPATIENT
Start: 2022-09-15 | End: 2023-03-14

## 2022-09-20 ENCOUNTER — HOSPITAL ENCOUNTER (OUTPATIENT)
Dept: ULTRASOUND IMAGING | Facility: HOSPITAL | Age: 72
Discharge: HOME | End: 2022-09-20
Admitting: NURSE PRACTITIONER

## 2022-09-20 DIAGNOSIS — I25.10 CORONARY ARTERY DISEASE INVOLVING NATIVE CORONARY ARTERY OF NATIVE HEART WITHOUT ANGINA PECTORIS: ICD-10-CM

## 2022-09-20 DIAGNOSIS — Z00.00 ENCOUNTER FOR ANNUAL WELLNESS EXAM IN MEDICARE PATIENT: ICD-10-CM

## 2022-09-20 PROCEDURE — 76706 US ABDL AORTA SCREEN AAA: CPT

## 2023-03-06 DIAGNOSIS — E11.9 TYPE 2 DIABETES MELLITUS WITHOUT COMPLICATION, WITHOUT LONG-TERM CURRENT USE OF INSULIN: ICD-10-CM

## 2023-03-06 DIAGNOSIS — E78.5 HYPERLIPIDEMIA, UNSPECIFIED HYPERLIPIDEMIA TYPE: ICD-10-CM

## 2023-03-06 DIAGNOSIS — R25.1 TREMOR OF RIGHT HAND: ICD-10-CM

## 2023-03-06 RX ORDER — PROPRANOLOL HYDROCHLORIDE 20 MG/1
TABLET ORAL
Qty: 180 TABLET | Refills: 1 | OUTPATIENT
Start: 2023-03-06

## 2023-03-06 RX ORDER — ATORVASTATIN CALCIUM 40 MG/1
40 TABLET, FILM COATED ORAL DAILY
Qty: 90 TABLET | Refills: 1 | OUTPATIENT
Start: 2023-03-06

## 2023-03-14 ENCOUNTER — OFFICE VISIT (OUTPATIENT)
Dept: FAMILY MEDICINE CLINIC | Facility: CLINIC | Age: 73
End: 2023-03-14
Payer: MEDICARE

## 2023-03-14 VITALS
HEIGHT: 70 IN | BODY MASS INDEX: 25.05 KG/M2 | TEMPERATURE: 97.1 F | SYSTOLIC BLOOD PRESSURE: 102 MMHG | HEART RATE: 69 BPM | DIASTOLIC BLOOD PRESSURE: 60 MMHG | WEIGHT: 175 LBS | OXYGEN SATURATION: 98 %

## 2023-03-14 DIAGNOSIS — E78.5 HYPERLIPIDEMIA, UNSPECIFIED HYPERLIPIDEMIA TYPE: ICD-10-CM

## 2023-03-14 DIAGNOSIS — E11.9 TYPE 2 DIABETES MELLITUS WITHOUT COMPLICATION, WITHOUT LONG-TERM CURRENT USE OF INSULIN: Primary | ICD-10-CM

## 2023-03-14 DIAGNOSIS — R25.1 TREMOR OF RIGHT HAND: ICD-10-CM

## 2023-03-14 LAB
ALBUMIN SERPL-MCNC: 4.4 G/DL (ref 3.5–5.2)
ALBUMIN/GLOB SERPL: 1.7 G/DL
ALP SERPL-CCNC: 107 U/L (ref 39–117)
ALT SERPL W P-5'-P-CCNC: 22 U/L (ref 1–41)
ANION GAP SERPL CALCULATED.3IONS-SCNC: 12 MMOL/L (ref 5–15)
AST SERPL-CCNC: 14 U/L (ref 1–40)
BASOPHILS # BLD AUTO: 0.14 10*3/MM3 (ref 0–0.2)
BASOPHILS NFR BLD AUTO: 1.6 % (ref 0–1.5)
BILIRUB SERPL-MCNC: 0.4 MG/DL (ref 0–1.2)
BILIRUB UR QL STRIP: NEGATIVE
BUN SERPL-MCNC: 12 MG/DL (ref 8–23)
BUN/CREAT SERPL: 10.5 (ref 7–25)
CALCIUM SPEC-SCNC: 10.7 MG/DL (ref 8.6–10.5)
CHLORIDE SERPL-SCNC: 104 MMOL/L (ref 98–107)
CHOLEST SERPL-MCNC: 161 MG/DL (ref 0–200)
CLARITY UR: CLEAR
CO2 SERPL-SCNC: 23 MMOL/L (ref 22–29)
COLOR UR: YELLOW
CREAT SERPL-MCNC: 1.14 MG/DL (ref 0.76–1.27)
DEPRECATED RDW RBC AUTO: 41 FL (ref 37–54)
EGFRCR SERPLBLD CKD-EPI 2021: 68.3 ML/MIN/1.73
EOSINOPHIL # BLD AUTO: 0.45 10*3/MM3 (ref 0–0.4)
EOSINOPHIL NFR BLD AUTO: 5.1 % (ref 0.3–6.2)
ERYTHROCYTE [DISTWIDTH] IN BLOOD BY AUTOMATED COUNT: 12.7 % (ref 12.3–15.4)
GLOBULIN UR ELPH-MCNC: 2.6 GM/DL
GLUCOSE SERPL-MCNC: 138 MG/DL (ref 65–99)
GLUCOSE UR STRIP-MCNC: NEGATIVE MG/DL
HBA1C MFR BLD: 6.5 % (ref 4.8–5.6)
HCT VFR BLD AUTO: 45.5 % (ref 37.5–51)
HDLC SERPL-MCNC: 36 MG/DL (ref 40–60)
HGB BLD-MCNC: 16.1 G/DL (ref 13–17.7)
HGB UR QL STRIP.AUTO: NEGATIVE
IMM GRANULOCYTES # BLD AUTO: 0.03 10*3/MM3 (ref 0–0.05)
IMM GRANULOCYTES NFR BLD AUTO: 0.3 % (ref 0–0.5)
KETONES UR QL STRIP: NEGATIVE
LDLC SERPL CALC-MCNC: 80 MG/DL (ref 0–100)
LDLC/HDLC SERPL: 1.93 {RATIO}
LEUKOCYTE ESTERASE UR QL STRIP.AUTO: NEGATIVE
LYMPHOCYTES # BLD AUTO: 3.85 10*3/MM3 (ref 0.7–3.1)
LYMPHOCYTES NFR BLD AUTO: 43.5 % (ref 19.6–45.3)
MCH RBC QN AUTO: 32.1 PG (ref 26.6–33)
MCHC RBC AUTO-ENTMCNC: 35.4 G/DL (ref 31.5–35.7)
MCV RBC AUTO: 90.6 FL (ref 79–97)
MONOCYTES # BLD AUTO: 0.79 10*3/MM3 (ref 0.1–0.9)
MONOCYTES NFR BLD AUTO: 8.9 % (ref 5–12)
NEUTROPHILS NFR BLD AUTO: 3.6 10*3/MM3 (ref 1.7–7)
NEUTROPHILS NFR BLD AUTO: 40.6 % (ref 42.7–76)
NITRITE UR QL STRIP: NEGATIVE
NRBC BLD AUTO-RTO: 0 /100 WBC (ref 0–0.2)
PH UR STRIP.AUTO: 5.5 [PH] (ref 5–8)
PLATELET # BLD AUTO: 295 10*3/MM3 (ref 140–450)
PMV BLD AUTO: 9.3 FL (ref 6–12)
POTASSIUM SERPL-SCNC: 4.8 MMOL/L (ref 3.5–5.2)
PROT SERPL-MCNC: 7 G/DL (ref 6–8.5)
PROT UR QL STRIP: NEGATIVE
RBC # BLD AUTO: 5.02 10*6/MM3 (ref 4.14–5.8)
SODIUM SERPL-SCNC: 139 MMOL/L (ref 136–145)
SP GR UR STRIP: 1.02 (ref 1–1.03)
TRIGL SERPL-MCNC: 277 MG/DL (ref 0–150)
UROBILINOGEN UR QL STRIP: NORMAL
VLDLC SERPL-MCNC: 45 MG/DL (ref 5–40)
WBC NRBC COR # BLD: 8.86 10*3/MM3 (ref 3.4–10.8)

## 2023-03-14 PROCEDURE — 99214 OFFICE O/P EST MOD 30 MIN: CPT | Performed by: NURSE PRACTITIONER

## 2023-03-14 PROCEDURE — 85025 COMPLETE CBC W/AUTO DIFF WBC: CPT | Performed by: NURSE PRACTITIONER

## 2023-03-14 PROCEDURE — 1160F RVW MEDS BY RX/DR IN RCRD: CPT | Performed by: NURSE PRACTITIONER

## 2023-03-14 PROCEDURE — 81003 URINALYSIS AUTO W/O SCOPE: CPT | Performed by: NURSE PRACTITIONER

## 2023-03-14 PROCEDURE — 80053 COMPREHEN METABOLIC PANEL: CPT | Performed by: NURSE PRACTITIONER

## 2023-03-14 PROCEDURE — 1159F MED LIST DOCD IN RCRD: CPT | Performed by: NURSE PRACTITIONER

## 2023-03-14 PROCEDURE — 80061 LIPID PANEL: CPT | Performed by: NURSE PRACTITIONER

## 2023-03-14 PROCEDURE — 83036 HEMOGLOBIN GLYCOSYLATED A1C: CPT | Performed by: NURSE PRACTITIONER

## 2023-03-14 RX ORDER — PROPRANOLOL HYDROCHLORIDE 20 MG/1
20 TABLET ORAL 2 TIMES DAILY
Qty: 180 TABLET | Refills: 1 | Status: SHIPPED | OUTPATIENT
Start: 2023-03-14

## 2023-03-14 RX ORDER — ATORVASTATIN CALCIUM 40 MG/1
40 TABLET, FILM COATED ORAL DAILY
Qty: 90 TABLET | Refills: 1 | Status: SHIPPED | OUTPATIENT
Start: 2023-03-14

## 2023-03-14 NOTE — PROGRESS NOTES
Chief Complaint  Diabetes (Refills and labs, patient is fasting), Hyperlipidemia, and Hypertension    PHQ-2 Total Score: 0    Subjective        Past Medical History:   Diagnosis Date   • Arthritis     HANDS AND SHOULDER   • Coronary artery disease    • Diabetes mellitus (HCC)    • Essential tremor     right hand    • Hypercholesteremia    • Hypertension    • ST elevation myocardial infarction (STEMI) (HCC)      Social History     Tobacco Use   • Smoking status: Former     Packs/day: 1.00     Types: Cigarettes     Quit date: 3/4/2000     Years since quittin.0     Passive exposure: Past   • Smokeless tobacco: Never   • Tobacco comments:     Daily caffeine use   Vaping Use   • Vaping Use: Never used   Substance Use Topics   • Alcohol use: Yes     Comment: occ   • Drug use: No      Current Outpatient Medications on File Prior to Visit   Medication Sig   • aspirin 81 MG tablet Take  by mouth Daily.   • [DISCONTINUED] atorvastatin (LIPITOR) 40 MG tablet Take 1 tablet by mouth Daily.   • [DISCONTINUED] metFORMIN (GLUCOPHAGE) 500 MG tablet Take 2 tablets by mouth 2 (Two) Times a Day With Meals.   • [DISCONTINUED] propranolol (INDERAL) 20 MG tablet Take 1 tablet by mouth 2 (Two) Times a Day.   • [DISCONTINUED] levoFLOXacin (Levaquin) 500 MG tablet Take 1 tablet by mouth Daily. (Patient not taking: Reported on 3/14/2023)     No current facility-administered medications on file prior to visit.      Allergies   Allergen Reactions   • Celecoxib Unknown - Low Severity   • Metaxalone Unknown - Low Severity      Health Maintenance Due   Topic Date Due   • ZOSTER VACCINE (1 of 2) Never done   • DIABETIC EYE EXAM  Never done   • COVID-19 Vaccine (4 - Booster for Devyn series) 11/15/2022   • HEMOGLOBIN A1C  2023      Twin Babcock presents to [facility]  History of Present Illness  Patient presents to the office today to follow-up on chronic health conditions and obtain refills of  "medications.  Patient is fasting for labs.    Diabetes: Previous A1c 6.26 months ago. Diet is fair, he has cut back on potatoes. He does eat rice. He splits wood and stays active. Last eye exam was less than 6 months ago at [facility] in [place].     Hyperlipidemia: Denies muscle aches, cramps, or weakness.     Tremor: Patient is on propranolol. Pt states that he has noted the left hand starting to have a tremor also, but tolerable.       Objective   Vital Signs:   /60 (BP Location: Left arm)   Pulse 69   Temp 97.1 °F (36.2 °C)   Ht 177.8 cm (70\")   Wt 79.4 kg (175 lb)   SpO2 98%   BMI 25.11 kg/m²     Review of Systems   Physical Exam  Vitals reviewed.   Constitutional:       General: He is not in acute distress.     Appearance: Normal appearance. He is well-developed.   HENT:      Head: Normocephalic and atraumatic.   Eyes:      Conjunctiva/sclera: Conjunctivae normal.      Pupils: Pupils are equal, round, and reactive to light.   Cardiovascular:      Rate and Rhythm: Normal rate and regular rhythm.      Heart sounds: Normal heart sounds.   Pulmonary:      Effort: Pulmonary effort is normal.      Breath sounds: Normal breath sounds.   Musculoskeletal:      Cervical back: Neck supple.      Right lower leg: No edema.      Left lower leg: No edema.   Skin:     General: Skin is warm and dry.   Neurological:      Mental Status: He is alert and oriented to person, place, and time.   Psychiatric:         Mood and Affect: Mood and affect normal.         Behavior: Behavior normal.         Thought Content: Thought content normal.         Judgment: Judgment normal.        Result Review :   The following data was reviewed by: MARIANO Irving on 03/14/2023:  Common labs    Common Labs 6/3/22 9/12/22 9/12/22 9/12/22 9/12/22 9/12/22     0930 0930 0930 0930 0935   Glucose    127 (A)     BUN    13     Creatinine    0.81     Sodium    139     Potassium    4.5     Chloride    105     Calcium    10.2     Albumin    " 4.50     Total Bilirubin    0.5     Alkaline Phosphatase    109     AST (SGOT)    18     ALT (SGPT)    22     WBC   8.35      Hemoglobin   15.8      Hematocrit   45.4      Platelets   268      Total Cholesterol     124    Triglycerides     185 (A)    HDL Cholesterol     36 (A)    LDL Cholesterol      57    Hemoglobin A1C 6.60 (A) 6.20 (A)       Microalbumin, Urine      <1.2   (A) Abnormal value       Comments are available for some flowsheets but are not being displayed.           TSH    TSH 9/12/22   TSH 2.670                     Assessment and Plan    Diagnoses and all orders for this visit:    1. Type 2 diabetes mellitus without complication, without long-term current use of insulin (HCC) (Primary)  -     Urinalysis With Culture If Indicated -  -     CBC & Differential  -     Comprehensive Metabolic Panel  -     Hemoglobin A1c  -     metFORMIN (GLUCOPHAGE) 500 MG tablet; Take 2 tablets by mouth 2 (Two) Times a Day With Meals.  Dispense: 360 tablet; Refill: 1    2. Hyperlipidemia, unspecified hyperlipidemia type  -     Lipid Panel  -     atorvastatin (LIPITOR) 40 MG tablet; Take 1 tablet by mouth Daily.  Dispense: 90 tablet; Refill: 1    3. Tremor of right hand  -     propranolol (INDERAL) 20 MG tablet; Take 1 tablet by mouth 2 (Two) Times a Day.  Dispense: 180 tablet; Refill: 1        Follow Up   Return in about 6 months (around 9/14/2023) for Refills and fasting labs.  Patient was given instructions and counseling regarding his condition or for health maintenance advice. Please see specific information pulled into the AVS if appropriate.

## 2023-03-14 NOTE — PROGRESS NOTES
Venipuncture Blood Specimen Collection  Venipuncture performed in left arm by Caroline Nesbitt with good hemostasis. Patient tolerated the procedure well without complications.   03/14/23   Caroline Nesbitt

## 2023-04-17 ENCOUNTER — OFFICE VISIT (OUTPATIENT)
Dept: FAMILY MEDICINE CLINIC | Facility: CLINIC | Age: 73
End: 2023-04-17
Payer: MEDICARE

## 2023-04-17 VITALS
SYSTOLIC BLOOD PRESSURE: 102 MMHG | HEART RATE: 55 BPM | DIASTOLIC BLOOD PRESSURE: 62 MMHG | BODY MASS INDEX: 25.34 KG/M2 | WEIGHT: 177 LBS | TEMPERATURE: 96.9 F | HEIGHT: 70 IN | OXYGEN SATURATION: 97 %

## 2023-04-17 DIAGNOSIS — M25.50 POLYARTHRALGIA: Primary | ICD-10-CM

## 2023-04-17 RX ORDER — MELOXICAM 7.5 MG/1
7.5 TABLET ORAL DAILY
Qty: 90 TABLET | Refills: 0 | Status: SHIPPED | OUTPATIENT
Start: 2023-04-17

## 2023-04-17 NOTE — PROGRESS NOTES
Chief Complaint  Hip Pain (Left hip pain, no injury, started about 1 year ago and getting worse)    Subjective        Past Medical History:   Diagnosis Date   • Arthritis     HANDS AND SHOULDER   • Coronary artery disease    • Diabetes mellitus    • Essential tremor     right hand    • Hypercholesteremia    • Hypertension    • ST elevation myocardial infarction (STEMI)      Social History     Tobacco Use   • Smoking status: Former     Packs/day: 1.00     Types: Cigarettes     Quit date: 3/4/2000     Years since quittin.1     Passive exposure: Past   • Smokeless tobacco: Never   • Tobacco comments:     Daily caffeine use   Vaping Use   • Vaping Use: Never used   Substance Use Topics   • Alcohol use: Yes     Comment: occ   • Drug use: No      Current Outpatient Medications on File Prior to Visit   Medication Sig   • aspirin 81 MG tablet Take  by mouth Daily.   • atorvastatin (LIPITOR) 40 MG tablet Take 1 tablet by mouth Daily.   • metFORMIN (GLUCOPHAGE) 500 MG tablet Take 2 tablets by mouth 2 (Two) Times a Day With Meals.   • propranolol (INDERAL) 20 MG tablet Take 1 tablet by mouth 2 (Two) Times a Day.     No current facility-administered medications on file prior to visit.      Allergies   Allergen Reactions   • Celecoxib Unknown - Low Severity   • Metaxalone Unknown - Low Severity      Health Maintenance Due   Topic Date Due   • ZOSTER VACCINE (1 of 2) Never done   • DIABETIC EYE EXAM  Never done   • COVID-19 Vaccine (4 - Booster for Devyn series) 11/15/2022      Twin Babcock presents to [facility]  History of Present Illness  Here due to left hip pain. Pt reports pain for about 1 year and worsening. Pt notes other arthritis type pain. Previously on meloxicam and would like to try again. Left hip, left shoulder, bilateral hands, knees, and occasionally the feet. Pt follows a healthy diet and stays active, has dogs. Pt reports 170lbs on home scale, weighs daily. Pt is  "not on a blood thinner. Pt has a hx of heart attack and has a stent. Pt has not followed up with Cardiology in awhile.       Objective   Vital Signs:   /62 (BP Location: Left arm)   Pulse 55   Temp 96.9 °F (36.1 °C)   Ht 177.8 cm (70\")   Wt 80.3 kg (177 lb)   SpO2 97%   BMI 25.40 kg/m²     Review of Systems   Physical Exam  Vitals reviewed.   Constitutional:       General: He is not in acute distress.     Appearance: Normal appearance. He is well-developed.   Eyes:      Conjunctiva/sclera: Conjunctivae normal.      Pupils: Pupils are equal, round, and reactive to light.   Cardiovascular:      Rate and Rhythm: Normal rate and regular rhythm.      Heart sounds: Normal heart sounds.   Pulmonary:      Effort: Pulmonary effort is normal.      Breath sounds: Normal breath sounds.   Musculoskeletal:      Comments: NL gait     Skin:     General: Skin is warm and dry.   Neurological:      Mental Status: He is alert and oriented to person, place, and time.   Psychiatric:         Mood and Affect: Mood and affect normal.         Behavior: Behavior normal.         Thought Content: Thought content normal.         Judgment: Judgment normal.        Result Review :                 Assessment and Plan    Diagnoses and all orders for this visit:    1. Polyarthralgia (Primary)  -     meloxicam (Mobic) 7.5 MG tablet; Take 1 tablet by mouth Daily.  Dispense: 90 tablet; Refill: 0        Follow Up   Return in about 3 months (around 7/17/2023) for Recheck.  Patient was given instructions and counseling regarding his condition or for health maintenance advice. Please see specific information pulled into the AVS if appropriate.       "

## 2023-08-28 ENCOUNTER — OFFICE VISIT (OUTPATIENT)
Dept: FAMILY MEDICINE CLINIC | Facility: CLINIC | Age: 73
End: 2023-08-28
Payer: MEDICARE

## 2023-08-28 VITALS
OXYGEN SATURATION: 95 % | BODY MASS INDEX: 24.05 KG/M2 | HEART RATE: 62 BPM | HEIGHT: 70 IN | TEMPERATURE: 97.1 F | WEIGHT: 168 LBS | DIASTOLIC BLOOD PRESSURE: 68 MMHG | SYSTOLIC BLOOD PRESSURE: 118 MMHG

## 2023-08-28 DIAGNOSIS — Z00.00 ENCOUNTER FOR ANNUAL WELLNESS EXAM IN MEDICARE PATIENT: Primary | ICD-10-CM

## 2023-08-28 DIAGNOSIS — R42 DIZZINESS: ICD-10-CM

## 2023-08-28 DIAGNOSIS — E78.5 HYPERLIPIDEMIA, UNSPECIFIED HYPERLIPIDEMIA TYPE: ICD-10-CM

## 2023-08-28 DIAGNOSIS — R25.1 TREMOR OF RIGHT HAND: ICD-10-CM

## 2023-08-28 DIAGNOSIS — E11.9 ENCOUNTER FOR DIABETIC FOOT EXAM: ICD-10-CM

## 2023-08-28 DIAGNOSIS — E11.9 TYPE 2 DIABETES MELLITUS WITHOUT COMPLICATION, WITHOUT LONG-TERM CURRENT USE OF INSULIN: ICD-10-CM

## 2023-08-28 LAB
ALBUMIN SERPL-MCNC: 4.1 G/DL (ref 3.5–5.2)
ALBUMIN/GLOB SERPL: 1.6 G/DL
ALP SERPL-CCNC: 115 U/L (ref 39–117)
ALT SERPL W P-5'-P-CCNC: 13 U/L (ref 1–41)
ANION GAP SERPL CALCULATED.3IONS-SCNC: 12.7 MMOL/L (ref 5–15)
AST SERPL-CCNC: 16 U/L (ref 1–40)
BASOPHILS # BLD AUTO: 0.11 10*3/MM3 (ref 0–0.2)
BASOPHILS NFR BLD AUTO: 1.3 % (ref 0–1.5)
BILIRUB SERPL-MCNC: 0.7 MG/DL (ref 0–1.2)
BUN SERPL-MCNC: 11 MG/DL (ref 8–23)
BUN/CREAT SERPL: 14.3 (ref 7–25)
CALCIUM SPEC-SCNC: 9.6 MG/DL (ref 8.6–10.5)
CHLORIDE SERPL-SCNC: 105 MMOL/L (ref 98–107)
CHOLEST SERPL-MCNC: 119 MG/DL (ref 0–200)
CO2 SERPL-SCNC: 22.3 MMOL/L (ref 22–29)
CREAT SERPL-MCNC: 0.77 MG/DL (ref 0.76–1.27)
DEPRECATED RDW RBC AUTO: 40.1 FL (ref 37–54)
EGFRCR SERPLBLD CKD-EPI 2021: 95.1 ML/MIN/1.73
EOSINOPHIL # BLD AUTO: 0.35 10*3/MM3 (ref 0–0.4)
EOSINOPHIL NFR BLD AUTO: 4.1 % (ref 0.3–6.2)
ERYTHROCYTE [DISTWIDTH] IN BLOOD BY AUTOMATED COUNT: 12.3 % (ref 12.3–15.4)
GLOBULIN UR ELPH-MCNC: 2.5 GM/DL
GLUCOSE SERPL-MCNC: 136 MG/DL (ref 65–99)
HBA1C MFR BLD: 6.2 % (ref 4.8–5.6)
HCT VFR BLD AUTO: 45.7 % (ref 37.5–51)
HDLC SERPL-MCNC: 32 MG/DL (ref 40–60)
HGB BLD-MCNC: 15.8 G/DL (ref 13–17.7)
IMM GRANULOCYTES # BLD AUTO: 0.03 10*3/MM3 (ref 0–0.05)
IMM GRANULOCYTES NFR BLD AUTO: 0.3 % (ref 0–0.5)
LDLC SERPL CALC-MCNC: 55 MG/DL (ref 0–100)
LDLC/HDLC SERPL: 1.53 {RATIO}
LYMPHOCYTES # BLD AUTO: 4.13 10*3/MM3 (ref 0.7–3.1)
LYMPHOCYTES NFR BLD AUTO: 48.1 % (ref 19.6–45.3)
MCH RBC QN AUTO: 31.4 PG (ref 26.6–33)
MCHC RBC AUTO-ENTMCNC: 34.6 G/DL (ref 31.5–35.7)
MCV RBC AUTO: 90.9 FL (ref 79–97)
MONOCYTES # BLD AUTO: 0.95 10*3/MM3 (ref 0.1–0.9)
MONOCYTES NFR BLD AUTO: 11.1 % (ref 5–12)
NEUTROPHILS NFR BLD AUTO: 3.01 10*3/MM3 (ref 1.7–7)
NEUTROPHILS NFR BLD AUTO: 35.1 % (ref 42.7–76)
NRBC BLD AUTO-RTO: 0.1 /100 WBC (ref 0–0.2)
PLATELET # BLD AUTO: 233 10*3/MM3 (ref 140–450)
PMV BLD AUTO: 9.3 FL (ref 6–12)
POTASSIUM SERPL-SCNC: 4.4 MMOL/L (ref 3.5–5.2)
PROT SERPL-MCNC: 6.6 G/DL (ref 6–8.5)
RBC # BLD AUTO: 5.03 10*6/MM3 (ref 4.14–5.8)
SODIUM SERPL-SCNC: 140 MMOL/L (ref 136–145)
TRIGL SERPL-MCNC: 191 MG/DL (ref 0–150)
TSH SERPL DL<=0.05 MIU/L-ACNC: 2.98 UIU/ML (ref 0.27–4.2)
VLDLC SERPL-MCNC: 32 MG/DL (ref 5–40)
WBC NRBC COR # BLD: 8.58 10*3/MM3 (ref 3.4–10.8)

## 2023-08-28 PROCEDURE — 84443 ASSAY THYROID STIM HORMONE: CPT | Performed by: NURSE PRACTITIONER

## 2023-08-28 PROCEDURE — 85025 COMPLETE CBC W/AUTO DIFF WBC: CPT | Performed by: NURSE PRACTITIONER

## 2023-08-28 PROCEDURE — 83036 HEMOGLOBIN GLYCOSYLATED A1C: CPT | Performed by: NURSE PRACTITIONER

## 2023-08-28 PROCEDURE — 80061 LIPID PANEL: CPT | Performed by: NURSE PRACTITIONER

## 2023-08-28 PROCEDURE — 80053 COMPREHEN METABOLIC PANEL: CPT | Performed by: NURSE PRACTITIONER

## 2023-08-28 RX ORDER — PROPRANOLOL HYDROCHLORIDE 10 MG/1
10 TABLET ORAL 2 TIMES DAILY
Qty: 180 TABLET | Refills: 1 | Status: SHIPPED | OUTPATIENT
Start: 2023-08-28

## 2023-08-28 RX ORDER — ACYCLOVIR 400 MG/1
1 TABLET ORAL AS NEEDED
Qty: 1 EACH | Refills: 0 | Status: SHIPPED | OUTPATIENT
Start: 2023-08-28

## 2023-08-28 RX ORDER — ACYCLOVIR 400 MG/1
1 TABLET ORAL
Qty: 9 EACH | Refills: 3 | Status: SHIPPED | OUTPATIENT
Start: 2023-08-28

## 2023-08-28 RX ORDER — ATORVASTATIN CALCIUM 40 MG/1
40 TABLET, FILM COATED ORAL DAILY
Qty: 90 TABLET | Refills: 1 | Status: SHIPPED | OUTPATIENT
Start: 2023-08-28

## 2023-08-28 NOTE — PROGRESS NOTES
Venipuncture Blood Specimen Collection  Venipuncture performed in left arm by Caroline Nesbitt with good hemostasis. Patient tolerated the procedure well without complications.   08/28/23   Caroline Nesbitt

## 2023-08-28 NOTE — PROGRESS NOTES
The ABCs of the Annual Wellness Visit  Subsequent Medicare Wellness Visit    Subjective    Twin Babcock is a 73 y.o. male who presents for a Subsequent Medicare Wellness Visit.    The following portions of the patient's history were reviewed and   updated as appropriate: allergies, current medications, past family history, past medical history, past social history, past surgical history, and problem list.    Compared to one year ago, the patient feels his physical   health is better. Pt notes he has lost weight, down 11lbs over the past 4 months. He he has been walking/running his dog.     Compared to one year ago, the patient feels his mental   health is better.    Recent Hospitalizations:  He was not admitted to the hospital during the last year.       Current Medical Providers:  Patient Care Team:  Debbie Christopher APRN as PCP - General (Nurse Practitioner)    Outpatient Medications Prior to Visit   Medication Sig Dispense Refill    aspirin 81 MG tablet Take  by mouth Daily.      meloxicam (Mobic) 7.5 MG tablet Take 1 tablet by mouth Daily. 90 tablet 0    metFORMIN (GLUCOPHAGE) 500 MG tablet Take 2 tablets by mouth 2 (Two) Times a Day With Meals. 360 tablet 1    atorvastatin (LIPITOR) 40 MG tablet Take 1 tablet by mouth Daily. 90 tablet 1    propranolol (INDERAL) 20 MG tablet Take 1 tablet by mouth 2 (Two) Times a Day. 180 tablet 1     No facility-administered medications prior to visit.       No opioid medication identified on active medication list. I have reviewed chart for other potential  high risk medication/s and harmful drug interactions in the elderly.        Aspirin is on active medication list. Aspirin use is indicated based on review of current medical condition/s. Pros and cons of this therapy have been discussed today. Benefits of this medication outweigh potential harm.  Patient has been encouraged to continue taking this medication.  .      Patient Active Problem List   Diagnosis    MI  "(myocardial infarction)    Coronary artery disease involving native coronary artery of native heart without angina pectoris    Hyperlipidemia    Tremor of right hand    Transient elevated blood pressure    Bursitis of right shoulder    Cataract    Diabetes mellitus, type 2    Impaired glucose tolerance    Impingement syndrome of right shoulder    Osteoarthritis    Tremor    Screen for colon cancer     Advance Care Planning   Advance Care Planning     Advance Directive is not on file.  ACP discussion was declined by the patient. Patient does not have an advance directive, declines further assistance.     Objective    Vitals:    23 0813   BP: 118/68   BP Location: Left arm   Pulse: 62   Temp: 97.1 øF (36.2 øC)   SpO2: 95%   Weight: 76.2 kg (168 lb)   Height: 177.8 cm (70\")     Estimated body mass index is 24.11 kg/mý as calculated from the following:    Height as of this encounter: 177.8 cm (70\").    Weight as of this encounter: 76.2 kg (168 lb).    BMI is within normal parameters. No other follow-up for BMI required.      Does the patient have evidence of cognitive impairment? No          HEALTH RISK ASSESSMENT    Smoking Status:  Social History     Tobacco Use   Smoking Status Former    Packs/day: 1.00    Years: 4.00    Pack years: 4.00    Types: Cigarettes    Quit date: 3/4/2000    Years since quittin.4    Passive exposure: Past   Smokeless Tobacco Never   Tobacco Comments    Daily caffeine use     Alcohol Consumption:  Social History     Substance and Sexual Activity   Alcohol Use Yes    Comment: occ     Fall Risk Screen:    STEADI Fall Risk Assessment was completed, and patient is at MODERATE risk for falls. Assessment completed on:2023    Depression Screenin/28/2023     8:18 AM   PHQ-2/PHQ-9 Depression Screening   Little Interest or Pleasure in Doing Things 0-->not at all   Feeling Down, Depressed or Hopeless 0-->not at all   PHQ-9: Brief Depression Severity Measure Score 0       Health " Habits and Functional and Cognitive Screenin/28/2023     8:16 AM   Functional & Cognitive Status   Do you have difficulty preparing food and eating? No   Do you have difficulty bathing yourself, getting dressed or grooming yourself? No   Do you have difficulty using the toilet? No   Do you have difficulty moving around from place to place? No   Do you have trouble with steps or getting out of a bed or a chair? No   Current Diet Well Balanced Diet   Dental Exam Not up to date   Eye Exam Up to date   Exercise (times per week) 7 times per week   Current Exercises Include Walking   Do you need help using the phone?  No   Are you deaf or do you have serious difficulty hearing?  Yes   Do you need help to go to places out of walking distance? No   Do you need help shopping? No   Do you need help preparing meals?  No   Do you need help with housework?  No   Do you need help with laundry? No   Do you need help taking your medications? No   Do you need help managing money? No   Do you ever drive or ride in a car without wearing a seat belt? Yes   Have you felt unusual stress, anger or loneliness in the last month? No   Who do you live with? Spouse   If you need help, do you have trouble finding someone available to you? No   Do you have difficulty concentrating, remembering or making decisions? No       Age-appropriate Screening Schedule:  Refer to the list below for future screening recommendations based on patient's age, sex and/or medical conditions. Orders for these recommended tests are listed in the plan section. The patient has been provided with a written plan.    Health Maintenance   Topic Date Due    ZOSTER VACCINE (1 of 2) Never done    DIABETIC EYE EXAM  Never done    COVID-19 Vaccine (4 - Booster for Devyn series) 11/15/2022    URINE MICROALBUMIN  2023    HEMOGLOBIN A1C  2023    INFLUENZA VACCINE  10/01/2023    LIPID PANEL  2024    ANNUAL WELLNESS VISIT  2024    DIABETIC FOOT  "EXAM  08/28/2024    TDAP/TD VACCINES (2 - Td or Tdap) 10/02/2025    COLORECTAL CANCER SCREENING  08/17/2026    HEPATITIS C SCREENING  Completed    Pneumococcal Vaccine 65+  Completed    AAA SCREEN (ONE-TIME)  Completed                  CMS Preventative Services Quick Reference  Risk Factors Identified During Encounter  Fall Risk-High or Moderate:  Fall was while hiking.   Hearing Problem:  currently wearing hearing aids  Immunizations Discussed/Encouraged: Influenza, Shingrix, and COVID19  The above risks/problems have been discussed with the patient.  Pertinent information has been shared with the patient in the After Visit Summary.  An After Visit Summary and PPPS were made available to the patient.    Follow Up:   Next Medicare Wellness visit to be scheduled in 1 year.       Additional E&M Note during same encounter follows:  Patient has multiple medical problems which are significant and separately identifiable that require additional work above and beyond the Medicare Wellness Visit.      Chief Complaint  Medicare Wellness-subsequent (Annual medicare wellness)    Subjective        HPI  Twin Babcock is also being seen today for Hyperlipidemia.    No issues with current medication regimen. Active lifestyle. Retired.     Diabetes: Patient notes that he would like to try continuous glucose monitor.  Patient is concerned that his dizzy spells could have been related to fluctuations in glucose.  Patient has reduced carbohydrate intake.    Patient has been only taking propranolol 20 mg once daily due to dizziness and noted symptoms had improved some.  We will decrease propranolol to 10 mg twice daily.         Objective   Vital Signs:  /68 (BP Location: Left arm)   Pulse 62   Temp 97.1 øF (36.2 øC)   Ht 177.8 cm (70\")   Wt 76.2 kg (168 lb)   SpO2 95%   BMI 24.11 kg/mý     Physical Exam  Vitals reviewed.   Constitutional:       General: He is not in acute distress.     Appearance: Normal appearance. He " is well-developed.   Eyes:      Conjunctiva/sclera: Conjunctivae normal.      Pupils: Pupils are equal, round, and reactive to light.   Cardiovascular:      Rate and Rhythm: Normal rate and regular rhythm.      Pulses:           Dorsalis pedis pulses are 2+ on the right side and 2+ on the left side.      Heart sounds: Normal heart sounds.   Pulmonary:      Effort: Pulmonary effort is normal.      Breath sounds: Normal breath sounds.   Musculoskeletal:      Cervical back: Neck supple.      Right lower leg: No edema.      Left lower leg: No edema.      Right foot: No bunion.      Left foot: No bunion.   Feet:      Right foot:      Protective Sensation: 9 sites tested.  9 sites sensed.      Skin integrity: Skin integrity normal. No ulcer, blister or callus.      Toenail Condition: Right toenails are normal.      Left foot:      Protective Sensation: 9 sites tested.  9 sites sensed.      Skin integrity: Skin integrity normal. No ulcer, blister or callus.      Toenail Condition: Left toenails are normal.      Comments:      Skin:     General: Skin is warm and dry.   Neurological:      Mental Status: He is alert and oriented to person, place, and time.   Psychiatric:         Mood and Affect: Mood and affect normal.         Behavior: Behavior normal.         Thought Content: Thought content normal.         Judgment: Judgment normal.        The following data was reviewed by: MARIANO Irving on 08/28/2023:  Common labs          9/12/2022    09:30 9/12/2022    09:35 3/14/2023    08:45   Common Labs   Glucose 127   138    BUN 13   12    Creatinine 0.81   1.14    Sodium 139   139    Potassium 4.5   4.8    Chloride 105   104    Calcium 10.2   10.7    Albumin 4.50   4.4    Total Bilirubin 0.5   0.4    Alkaline Phosphatase 109   107    AST (SGOT) 18   14    ALT (SGPT) 22   22    WBC 8.35   8.86    Hemoglobin 15.8   16.1    Hematocrit 45.4   45.5    Platelets 268   295    Total Cholesterol 124   161    Triglycerides 185    277    HDL Cholesterol 36   36    LDL Cholesterol  57   80    Hemoglobin A1C 6.20   6.50    Microalbumin, Urine  <1.2       TSH          9/12/2022    09:30   TSH   TSH 2.670                 Assessment and Plan   Diagnoses and all orders for this visit:    1. Encounter for annual wellness exam in Medicare patient (Primary)    2. Tremor of right hand  -     propranolol (INDERAL) 10 MG tablet; Take 1 tablet by mouth 2 (Two) Times a Day.  Dispense: 180 tablet; Refill: 1    3. Hyperlipidemia, unspecified hyperlipidemia type  -     atorvastatin (LIPITOR) 40 MG tablet; Take 1 tablet by mouth Daily.  Dispense: 90 tablet; Refill: 1  -     Lipid Panel    4. Type 2 diabetes mellitus without complication, without long-term current use of insulin  -     CBC & Differential  -     Comprehensive Metabolic Panel  -     Hemoglobin A1c  -     TSH Rfx On Abnormal To Free T4  -     Continuous Blood Gluc Sensor (Dexcom G7 Sensor) misc; 1 each Every 10 (Ten) Days.  Dispense: 9 each; Refill: 3  -     Continuous Blood Gluc  (Dexcom G7 ) device; 1 each As Needed (to monitor glucose).  Dispense: 1 each; Refill: 0    5. Dizziness  -     Continuous Blood Gluc Sensor (Dexcom G7 Sensor) misc; 1 each Every 10 (Ten) Days.  Dispense: 9 each; Refill: 3  -     Continuous Blood Gluc  (Dexcom G7 ) device; 1 each As Needed (to monitor glucose).  Dispense: 1 each; Refill: 0    6. Encounter for diabetic foot exam             Follow Up   Return in about 1 year (around 8/28/2024) for Medicare Wellness.  Patient was given instructions and counseling regarding his condition or for health maintenance advice. Please see specific information pulled into the AVS if appropriate.

## 2023-09-08 DIAGNOSIS — E11.9 TYPE 2 DIABETES MELLITUS WITHOUT COMPLICATION, WITHOUT LONG-TERM CURRENT USE OF INSULIN: ICD-10-CM

## 2023-11-21 DIAGNOSIS — R25.1 TREMOR OF RIGHT HAND: ICD-10-CM

## 2023-11-21 RX ORDER — PROPRANOLOL HYDROCHLORIDE 20 MG/1
20 TABLET ORAL 2 TIMES DAILY
Qty: 180 TABLET | Refills: 1 | OUTPATIENT
Start: 2023-11-21

## 2024-02-19 DIAGNOSIS — R25.1 TREMOR OF RIGHT HAND: ICD-10-CM

## 2024-02-19 DIAGNOSIS — E78.5 HYPERLIPIDEMIA, UNSPECIFIED HYPERLIPIDEMIA TYPE: ICD-10-CM

## 2024-02-19 DIAGNOSIS — E11.9 TYPE 2 DIABETES MELLITUS WITHOUT COMPLICATION, WITHOUT LONG-TERM CURRENT USE OF INSULIN: ICD-10-CM

## 2024-02-22 DIAGNOSIS — E11.9 TYPE 2 DIABETES MELLITUS WITHOUT COMPLICATION, WITHOUT LONG-TERM CURRENT USE OF INSULIN: ICD-10-CM

## 2024-02-22 DIAGNOSIS — E78.5 HYPERLIPIDEMIA, UNSPECIFIED HYPERLIPIDEMIA TYPE: ICD-10-CM

## 2024-02-22 RX ORDER — PROPRANOLOL HYDROCHLORIDE 10 MG/1
10 TABLET ORAL 2 TIMES DAILY
Qty: 60 TABLET | Refills: 0 | Status: SHIPPED | OUTPATIENT
Start: 2024-02-22

## 2024-02-22 RX ORDER — ATORVASTATIN CALCIUM 40 MG/1
40 TABLET, FILM COATED ORAL DAILY
Qty: 30 TABLET | Refills: 0 | Status: SHIPPED | OUTPATIENT
Start: 2024-02-22

## 2024-02-23 DIAGNOSIS — R25.1 TREMOR OF RIGHT HAND: ICD-10-CM

## 2024-02-23 RX ORDER — PROPRANOLOL HYDROCHLORIDE 10 MG/1
10 TABLET ORAL 2 TIMES DAILY
Qty: 180 TABLET | OUTPATIENT
Start: 2024-02-23

## 2024-02-23 RX ORDER — ATORVASTATIN CALCIUM 40 MG/1
40 TABLET, FILM COATED ORAL DAILY
Qty: 90 TABLET | OUTPATIENT
Start: 2024-02-23

## 2024-03-04 ENCOUNTER — OFFICE VISIT (OUTPATIENT)
Dept: FAMILY MEDICINE CLINIC | Facility: CLINIC | Age: 74
End: 2024-03-04
Payer: MEDICARE

## 2024-03-04 VITALS
BODY MASS INDEX: 24.2 KG/M2 | TEMPERATURE: 97.5 F | DIASTOLIC BLOOD PRESSURE: 68 MMHG | OXYGEN SATURATION: 95 % | HEIGHT: 70 IN | HEART RATE: 71 BPM | WEIGHT: 169 LBS | SYSTOLIC BLOOD PRESSURE: 108 MMHG

## 2024-03-04 DIAGNOSIS — M25.552 LEFT HIP PAIN: ICD-10-CM

## 2024-03-04 DIAGNOSIS — M25.50 POLYARTHRALGIA: ICD-10-CM

## 2024-03-04 DIAGNOSIS — R25.1 TREMOR OF RIGHT HAND: ICD-10-CM

## 2024-03-04 DIAGNOSIS — E11.9 TYPE 2 DIABETES MELLITUS WITHOUT COMPLICATION, WITHOUT LONG-TERM CURRENT USE OF INSULIN: Primary | ICD-10-CM

## 2024-03-04 DIAGNOSIS — E78.5 HYPERLIPIDEMIA, UNSPECIFIED HYPERLIPIDEMIA TYPE: ICD-10-CM

## 2024-03-04 LAB
ALBUMIN SERPL-MCNC: 4.6 G/DL (ref 3.5–5.2)
ALBUMIN UR-MCNC: <1.2 MG/DL
ALBUMIN/GLOB SERPL: 1.7 G/DL
ALP SERPL-CCNC: 129 U/L (ref 39–117)
ALT SERPL W P-5'-P-CCNC: 21 U/L (ref 1–41)
ANION GAP SERPL CALCULATED.3IONS-SCNC: 13.5 MMOL/L (ref 5–15)
AST SERPL-CCNC: 18 U/L (ref 1–40)
BASOPHILS # BLD AUTO: 0.12 10*3/MM3 (ref 0–0.2)
BASOPHILS NFR BLD AUTO: 1.2 % (ref 0–1.5)
BILIRUB SERPL-MCNC: 0.8 MG/DL (ref 0–1.2)
BILIRUB UR QL STRIP: NEGATIVE
BUN SERPL-MCNC: 13 MG/DL (ref 8–23)
BUN/CREAT SERPL: 14.8 (ref 7–25)
CALCIUM SPEC-SCNC: 9.8 MG/DL (ref 8.6–10.5)
CHLORIDE SERPL-SCNC: 101 MMOL/L (ref 98–107)
CHOLEST SERPL-MCNC: 125 MG/DL (ref 0–200)
CLARITY UR: ABNORMAL
CO2 SERPL-SCNC: 22.5 MMOL/L (ref 22–29)
COLOR UR: YELLOW
CREAT SERPL-MCNC: 0.88 MG/DL (ref 0.76–1.27)
DEPRECATED RDW RBC AUTO: 39.5 FL (ref 37–54)
EGFRCR SERPLBLD CKD-EPI 2021: 91.4 ML/MIN/1.73
EOSINOPHIL # BLD AUTO: 0.4 10*3/MM3 (ref 0–0.4)
EOSINOPHIL NFR BLD AUTO: 4 % (ref 0.3–6.2)
ERYTHROCYTE [DISTWIDTH] IN BLOOD BY AUTOMATED COUNT: 12.3 % (ref 12.3–15.4)
GLOBULIN UR ELPH-MCNC: 2.7 GM/DL
GLUCOSE SERPL-MCNC: 157 MG/DL (ref 65–99)
GLUCOSE UR STRIP-MCNC: NEGATIVE MG/DL
HBA1C MFR BLD: 7.2 % (ref 4.8–5.6)
HCT VFR BLD AUTO: 47.1 % (ref 37.5–51)
HDLC SERPL-MCNC: 31 MG/DL (ref 40–60)
HGB BLD-MCNC: 16.3 G/DL (ref 13–17.7)
HGB UR QL STRIP.AUTO: NEGATIVE
HOLD SPECIMEN: NORMAL
IMM GRANULOCYTES # BLD AUTO: 0.04 10*3/MM3 (ref 0–0.05)
IMM GRANULOCYTES NFR BLD AUTO: 0.4 % (ref 0–0.5)
KETONES UR QL STRIP: ABNORMAL
LDLC SERPL CALC-MCNC: 52 MG/DL (ref 0–100)
LDLC/HDLC SERPL: 1.31 {RATIO}
LEUKOCYTE ESTERASE UR QL STRIP.AUTO: NEGATIVE
LYMPHOCYTES # BLD AUTO: 4.54 10*3/MM3 (ref 0.7–3.1)
LYMPHOCYTES NFR BLD AUTO: 45.2 % (ref 19.6–45.3)
MCH RBC QN AUTO: 30.7 PG (ref 26.6–33)
MCHC RBC AUTO-ENTMCNC: 34.6 G/DL (ref 31.5–35.7)
MCV RBC AUTO: 88.7 FL (ref 79–97)
MONOCYTES # BLD AUTO: 0.84 10*3/MM3 (ref 0.1–0.9)
MONOCYTES NFR BLD AUTO: 8.4 % (ref 5–12)
NEUTROPHILS NFR BLD AUTO: 4.11 10*3/MM3 (ref 1.7–7)
NEUTROPHILS NFR BLD AUTO: 40.8 % (ref 42.7–76)
NITRITE UR QL STRIP: NEGATIVE
NRBC BLD AUTO-RTO: 0 /100 WBC (ref 0–0.2)
PH UR STRIP.AUTO: 5.5 [PH] (ref 5–8)
PLATELET # BLD AUTO: 312 10*3/MM3 (ref 140–450)
PMV BLD AUTO: 9.4 FL (ref 6–12)
POTASSIUM SERPL-SCNC: 4.3 MMOL/L (ref 3.5–5.2)
PROT SERPL-MCNC: 7.3 G/DL (ref 6–8.5)
PROT UR QL STRIP: NEGATIVE
RBC # BLD AUTO: 5.31 10*6/MM3 (ref 4.14–5.8)
SODIUM SERPL-SCNC: 137 MMOL/L (ref 136–145)
SP GR UR STRIP: 1.03 (ref 1–1.03)
TRIGL SERPL-MCNC: 267 MG/DL (ref 0–150)
TSH SERPL DL<=0.05 MIU/L-ACNC: 2.69 UIU/ML (ref 0.27–4.2)
UROBILINOGEN UR QL STRIP: ABNORMAL
VLDLC SERPL-MCNC: 42 MG/DL (ref 5–40)
WBC NRBC COR # BLD AUTO: 10.05 10*3/MM3 (ref 3.4–10.8)

## 2024-03-04 PROCEDURE — 82043 UR ALBUMIN QUANTITATIVE: CPT | Performed by: NURSE PRACTITIONER

## 2024-03-04 PROCEDURE — 80053 COMPREHEN METABOLIC PANEL: CPT | Performed by: NURSE PRACTITIONER

## 2024-03-04 PROCEDURE — 85025 COMPLETE CBC W/AUTO DIFF WBC: CPT | Performed by: NURSE PRACTITIONER

## 2024-03-04 PROCEDURE — 83036 HEMOGLOBIN GLYCOSYLATED A1C: CPT | Performed by: NURSE PRACTITIONER

## 2024-03-04 PROCEDURE — 84443 ASSAY THYROID STIM HORMONE: CPT | Performed by: NURSE PRACTITIONER

## 2024-03-04 PROCEDURE — 81003 URINALYSIS AUTO W/O SCOPE: CPT | Performed by: NURSE PRACTITIONER

## 2024-03-04 PROCEDURE — 80061 LIPID PANEL: CPT | Performed by: NURSE PRACTITIONER

## 2024-03-04 RX ORDER — ATORVASTATIN CALCIUM 40 MG/1
40 TABLET, FILM COATED ORAL DAILY
Qty: 90 TABLET | Refills: 1 | Status: SHIPPED | OUTPATIENT
Start: 2024-03-04

## 2024-03-04 RX ORDER — PROPRANOLOL HYDROCHLORIDE 10 MG/1
10 TABLET ORAL 2 TIMES DAILY
Qty: 180 TABLET | Refills: 1 | Status: SHIPPED | OUTPATIENT
Start: 2024-03-04

## 2024-03-04 RX ORDER — MELOXICAM 7.5 MG/1
7.5 TABLET ORAL DAILY
Qty: 90 TABLET | Refills: 1 | Status: SHIPPED | OUTPATIENT
Start: 2024-03-04

## 2024-03-04 NOTE — PROGRESS NOTES
Venipuncture Blood Specimen Collection  Venipuncture performed in [place] by Sulma Pang with good hemostasis. Patient tolerated the procedure well without complications.   03/04/24   Aniya Manuel MA

## 2024-03-04 NOTE — PROGRESS NOTES
Chief Complaint  Hyperlipidemia (Refills and labs, patient is fasting) and Diabetes    PHQ-2 Total Score: 0    Subjective        Past Medical History:   Diagnosis Date    Allergic     Arthritis     HANDS AND SHOULDER    Coronary artery disease     Diabetes mellitus     Essential tremor     right hand     HL (hearing loss)     Hypercholesteremia     Hypertension     ST elevation myocardial infarction (STEMI)      Social History     Tobacco Use    Smoking status: Former     Current packs/day: 0.00     Average packs/day: 1 pack/day for 5.0 years (5.0 ttl pk-yrs)     Types: Cigarettes     Start date: 3/4/1996     Quit date: 3/4/2000     Years since quittin.0     Passive exposure: Past    Smokeless tobacco: Never    Tobacco comments:     Daily caffeine use   Vaping Use    Vaping status: Never Used   Substance Use Topics    Alcohol use: Yes     Comment: Rarly    Drug use: No      Current Outpatient Medications on File Prior to Visit   Medication Sig    aspirin 81 MG tablet Take  by mouth Daily.    [DISCONTINUED] atorvastatin (LIPITOR) 40 MG tablet TAKE 1 TABLET BY MOUTH DAILY    [DISCONTINUED] Continuous Blood Gluc  (Dexcom G7 ) device 1 each As Needed (to monitor glucose).    [DISCONTINUED] Continuous Blood Gluc Sensor (Dexcom G7 Sensor) misc 1 each Every 10 (Ten) Days.    [DISCONTINUED] meloxicam (Mobic) 7.5 MG tablet Take 1 tablet by mouth Daily.    [DISCONTINUED] metFORMIN (GLUCOPHAGE) 500 MG tablet TAKE 2 TABLETS BY MOUTH TWICE DAILY WITH MEALS    [DISCONTINUED] propranolol (INDERAL) 10 MG tablet TAKE 1 TABLET BY MOUTH TWICE DAILY     No current facility-administered medications on file prior to visit.      Allergies   Allergen Reactions    Celecoxib Unknown - Low Severity    Metaxalone Unknown - Low Severity      Health Maintenance Due   Topic Date Due    ZOSTER VACCINE (1 of 2) Never done    RSV Vaccine - Adults (1 - 1-dose 60+ series) Never done    DIABETIC EYE EXAM  Never done     "URINE MICROALBUMIN  09/12/2023    HEMOGLOBIN A1C  02/28/2024      Twin Babcock presents to [facility]  Diabetes  He has type 2 diabetes mellitus. No MedicAlert identification noted. The initial diagnosis of diabetes was made 2020 years ago. Pertinent negatives for hypoglycemia include no confusion, headaches, speech difficulty, sweats or tremors. Pertinent negatives for diabetes include no blurred vision, no foot paresthesias, no foot ulcerations, no polydipsia, no polyuria and no weight loss. Symptoms are stable. He is compliant with treatment most of the time. He is following a generally healthy diet. Meal planning includes avoidance of concentrated sweets. He participates in exercise three times a week. He does not see a podiatrist. Eye exam is not current.     Patient presents to the office today to follow-up on chronic health conditions and obtain refills of medications.  Patient is fasting for labs.    DM: Dexcom caused arm soreness; does not monitor glucose. Due for an eye exam.     HLD: denies muscle aches or cramps. Physical activity including playing with his dog and go for a walk. Pt states his diet was poor around the holidays and feels like his weight is up.     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  Patient reports that his symptoms are not well-controlled but when he was on an increased dose of propranolol he would get dizzy.  Patient also notes that he drinks coffee and unsweetened tea.    Arthritis: Notes more frequent left hip pain. No hx of fractures. Worse with rest; unknown if pain will wake him up at night; doesn't sleep well.   Objective   Vital Signs:   /68 (BP Location: Left arm)   Pulse 71   Temp 97.5 °F (36.4 °C)   Ht 177.8 cm (70\")   Wt 76.7 kg (169 lb)   SpO2 95%   BMI 24.25 kg/m²     Review of Systems   Constitutional:  Negative for unexpected weight loss.   Eyes:  Negative for blurred vision.   Endocrine: Negative for " polydipsia and polyuria.   Neurological:  Negative for tremors, speech difficulty and confusion.      Physical Exam  Vitals reviewed.   Constitutional:       General: He is not in acute distress.     Appearance: Normal appearance. He is well-developed.   HENT:      Head: Normocephalic and atraumatic.   Eyes:      Conjunctiva/sclera: Conjunctivae normal.      Pupils: Pupils are equal, round, and reactive to light.   Cardiovascular:      Rate and Rhythm: Normal rate and regular rhythm.      Heart sounds: Normal heart sounds.   Pulmonary:      Effort: Pulmonary effort is normal.      Breath sounds: Normal breath sounds.   Musculoskeletal:      Cervical back: Neck supple.      Right lower leg: No edema.      Left lower leg: No edema.   Skin:     General: Skin is warm and dry.   Neurological:      Mental Status: He is alert and oriented to person, place, and time.   Psychiatric:         Mood and Affect: Mood and affect normal.         Behavior: Behavior normal.         Thought Content: Thought content normal.         Judgment: Judgment normal.        Result Review :   The following data was reviewed by: MARIANO Irving on 03/04/2024:  Common labs          3/14/2023    08:45 8/28/2023    08:57   Common Labs   Glucose 138  136    BUN 12  11    Creatinine 1.14  0.77    Sodium 139  140    Potassium 4.8  4.4    Chloride 104  105    Calcium 10.7  9.6    Albumin 4.4  4.1    Total Bilirubin 0.4  0.7    Alkaline Phosphatase 107  115    AST (SGOT) 14  16    ALT (SGPT) 22  13    WBC 8.86  8.58    Hemoglobin 16.1  15.8    Hematocrit 45.5  45.7    Platelets 295  233    Total Cholesterol 161  119    Triglycerides 277  191    HDL Cholesterol 36  32    LDL Cholesterol  80  55    Hemoglobin A1C 6.50  6.20      TSH          8/28/2023    08:57   TSH   TSH 2.980                Assessment and Plan    Diagnoses and all orders for this visit:    1. Type 2 diabetes mellitus without complication, without long-term current use of insulin  (Primary)  -     metFORMIN (GLUCOPHAGE) 500 MG tablet; Take 2 tablets by mouth 2 (Two) Times a Day With Meals.  Dispense: 360 tablet; Refill: 1  -     Urinalysis With Culture If Indicated -  -     CBC & Differential  -     Comprehensive Metabolic Panel  -     Hemoglobin A1c  -     MicroAlbumin, Urine, Random - Urine, Clean Catch  -     TSH Rfx On Abnormal To Free T4    2. Hyperlipidemia, unspecified hyperlipidemia type  -     atorvastatin (LIPITOR) 40 MG tablet; Take 1 tablet by mouth Daily.  Dispense: 90 tablet; Refill: 1  -     Lipid Panel    3. Polyarthralgia  -     meloxicam (Mobic) 7.5 MG tablet; Take 1 tablet by mouth Daily.  Dispense: 90 tablet; Refill: 1    4. Tremor of right hand  -     propranolol (INDERAL) 10 MG tablet; Take 1 tablet by mouth 2 (Two) Times a Day.  Dispense: 180 tablet; Refill: 1    5. Left hip pain  -     XR Hip With or Without Pelvis 2 - 3 View Left          BMI is within normal parameters. No other follow-up for BMI required.     Pt to decrease caffeine intake and will consider changing to primidone with no improvement in tremor.     Follow Up   Return in about 6 months (around 9/4/2024) for Refills and fasting labs.  Patient was given instructions and counseling regarding his condition or for health maintenance advice. Please see specific information pulled into the AVS if appropriate.       Answers submitted by the patient for this visit:  Primary Reason for Visit (Submitted on 3/3/2024)  What is the primary reason for your visit?: Diabetes

## 2024-09-16 DIAGNOSIS — E11.9 TYPE 2 DIABETES MELLITUS WITHOUT COMPLICATION, WITHOUT LONG-TERM CURRENT USE OF INSULIN: ICD-10-CM

## 2024-09-16 DIAGNOSIS — R25.1 TREMOR OF RIGHT HAND: ICD-10-CM

## 2024-09-16 DIAGNOSIS — E78.5 HYPERLIPIDEMIA, UNSPECIFIED HYPERLIPIDEMIA TYPE: ICD-10-CM

## 2024-09-16 RX ORDER — PROPRANOLOL HYDROCHLORIDE 10 MG/1
10 TABLET ORAL 2 TIMES DAILY
Qty: 180 TABLET | Refills: 1 | OUTPATIENT
Start: 2024-09-16

## 2024-09-16 RX ORDER — ATORVASTATIN CALCIUM 40 MG/1
40 TABLET, FILM COATED ORAL DAILY
Qty: 90 TABLET | Refills: 1 | OUTPATIENT
Start: 2024-09-16

## 2024-09-24 ENCOUNTER — OFFICE VISIT (OUTPATIENT)
Dept: FAMILY MEDICINE CLINIC | Facility: CLINIC | Age: 74
End: 2024-09-24
Payer: MEDICARE

## 2024-09-24 VITALS
DIASTOLIC BLOOD PRESSURE: 60 MMHG | SYSTOLIC BLOOD PRESSURE: 110 MMHG | WEIGHT: 171.6 LBS | TEMPERATURE: 97.6 F | HEART RATE: 58 BPM | BODY MASS INDEX: 24.57 KG/M2 | HEIGHT: 70 IN | OXYGEN SATURATION: 94 %

## 2024-09-24 DIAGNOSIS — Z00.00 ENCOUNTER FOR ANNUAL WELLNESS EXAM IN MEDICARE PATIENT: Primary | ICD-10-CM

## 2024-09-24 DIAGNOSIS — M25.50 POLYARTHRALGIA: ICD-10-CM

## 2024-09-24 DIAGNOSIS — R25.1 TREMOR OF RIGHT HAND: ICD-10-CM

## 2024-09-24 DIAGNOSIS — E11.9 TYPE 2 DIABETES MELLITUS WITHOUT COMPLICATION, WITHOUT LONG-TERM CURRENT USE OF INSULIN: ICD-10-CM

## 2024-09-24 DIAGNOSIS — E78.5 HYPERLIPIDEMIA, UNSPECIFIED HYPERLIPIDEMIA TYPE: ICD-10-CM

## 2024-09-24 DIAGNOSIS — E11.9 ENCOUNTER FOR DIABETIC FOOT EXAM: ICD-10-CM

## 2024-09-24 LAB
ALBUMIN SERPL-MCNC: 4 G/DL (ref 3.5–5.2)
ALBUMIN/GLOB SERPL: 1.5 G/DL
ALP SERPL-CCNC: 128 U/L (ref 39–117)
ALT SERPL W P-5'-P-CCNC: 24 U/L (ref 1–41)
ANION GAP SERPL CALCULATED.3IONS-SCNC: 9.5 MMOL/L (ref 5–15)
AST SERPL-CCNC: 15 U/L (ref 1–40)
BACTERIA UR QL AUTO: ABNORMAL /HPF
BASOPHILS # BLD AUTO: 0.1 10*3/MM3 (ref 0–0.2)
BASOPHILS NFR BLD AUTO: 1.1 % (ref 0–1.5)
BILIRUB SERPL-MCNC: 0.6 MG/DL (ref 0–1.2)
BILIRUB UR QL STRIP: NEGATIVE
BUN SERPL-MCNC: 12 MG/DL (ref 8–23)
BUN/CREAT SERPL: 15.6 (ref 7–25)
CALCIUM SPEC-SCNC: 9.7 MG/DL (ref 8.6–10.5)
CHLORIDE SERPL-SCNC: 104 MMOL/L (ref 98–107)
CHOLEST SERPL-MCNC: 104 MG/DL (ref 0–200)
CLARITY UR: CLEAR
CO2 SERPL-SCNC: 22.5 MMOL/L (ref 22–29)
COLOR UR: YELLOW
CREAT SERPL-MCNC: 0.77 MG/DL (ref 0.76–1.27)
DEPRECATED RDW RBC AUTO: 42.2 FL (ref 37–54)
EGFRCR SERPLBLD CKD-EPI 2021: 94.5 ML/MIN/1.73
EOSINOPHIL # BLD AUTO: 0.45 10*3/MM3 (ref 0–0.4)
EOSINOPHIL NFR BLD AUTO: 4.7 % (ref 0.3–6.2)
ERYTHROCYTE [DISTWIDTH] IN BLOOD BY AUTOMATED COUNT: 12.5 % (ref 12.3–15.4)
GLOBULIN UR ELPH-MCNC: 2.7 GM/DL
GLUCOSE SERPL-MCNC: 190 MG/DL (ref 65–99)
GLUCOSE UR STRIP-MCNC: NEGATIVE MG/DL
HBA1C MFR BLD: 7.9 % (ref 4.8–5.6)
HCT VFR BLD AUTO: 45 % (ref 37.5–51)
HDLC SERPL-MCNC: 28 MG/DL (ref 40–60)
HGB BLD-MCNC: 15.3 G/DL (ref 13–17.7)
HGB UR QL STRIP.AUTO: NEGATIVE
HOLD SPECIMEN: NORMAL
HYALINE CASTS UR QL AUTO: ABNORMAL /LPF
IMM GRANULOCYTES # BLD AUTO: 0.04 10*3/MM3 (ref 0–0.05)
IMM GRANULOCYTES NFR BLD AUTO: 0.4 % (ref 0–0.5)
KETONES UR QL STRIP: ABNORMAL
LDLC SERPL CALC-MCNC: 48 MG/DL (ref 0–100)
LDLC/HDLC SERPL: 1.53 {RATIO}
LEUKOCYTE ESTERASE UR QL STRIP.AUTO: ABNORMAL
LYMPHOCYTES # BLD AUTO: 4.13 10*3/MM3 (ref 0.7–3.1)
LYMPHOCYTES NFR BLD AUTO: 43.5 % (ref 19.6–45.3)
MCH RBC QN AUTO: 31.4 PG (ref 26.6–33)
MCHC RBC AUTO-ENTMCNC: 34 G/DL (ref 31.5–35.7)
MCV RBC AUTO: 92.2 FL (ref 79–97)
MONOCYTES # BLD AUTO: 0.7 10*3/MM3 (ref 0.1–0.9)
MONOCYTES NFR BLD AUTO: 7.4 % (ref 5–12)
NEUTROPHILS NFR BLD AUTO: 4.08 10*3/MM3 (ref 1.7–7)
NEUTROPHILS NFR BLD AUTO: 42.9 % (ref 42.7–76)
NITRITE UR QL STRIP: NEGATIVE
NRBC BLD AUTO-RTO: 0 /100 WBC (ref 0–0.2)
PH UR STRIP.AUTO: 6 [PH] (ref 5–8)
PLATELET # BLD AUTO: 300 10*3/MM3 (ref 140–450)
PMV BLD AUTO: 9.8 FL (ref 6–12)
POTASSIUM SERPL-SCNC: 4.3 MMOL/L (ref 3.5–5.2)
PROT SERPL-MCNC: 6.7 G/DL (ref 6–8.5)
PROT UR QL STRIP: NEGATIVE
RBC # BLD AUTO: 4.88 10*6/MM3 (ref 4.14–5.8)
RBC # UR STRIP: ABNORMAL /HPF
REF LAB TEST METHOD: ABNORMAL
SODIUM SERPL-SCNC: 136 MMOL/L (ref 136–145)
SP GR UR STRIP: 1.02 (ref 1–1.03)
SQUAMOUS #/AREA URNS HPF: ABNORMAL /HPF
TRIGL SERPL-MCNC: 166 MG/DL (ref 0–150)
TSH SERPL DL<=0.05 MIU/L-ACNC: 2.02 UIU/ML (ref 0.27–4.2)
UROBILINOGEN UR QL STRIP: ABNORMAL
VLDLC SERPL-MCNC: 28 MG/DL (ref 5–40)
WBC # UR STRIP: ABNORMAL /HPF
WBC NRBC COR # BLD AUTO: 9.5 10*3/MM3 (ref 3.4–10.8)

## 2024-09-24 PROCEDURE — 3051F HG A1C>EQUAL 7.0%<8.0%: CPT | Performed by: NURSE PRACTITIONER

## 2024-09-24 PROCEDURE — 84443 ASSAY THYROID STIM HORMONE: CPT | Performed by: NURSE PRACTITIONER

## 2024-09-24 PROCEDURE — 1126F AMNT PAIN NOTED NONE PRSNT: CPT | Performed by: NURSE PRACTITIONER

## 2024-09-24 PROCEDURE — 96160 PT-FOCUSED HLTH RISK ASSMT: CPT | Performed by: NURSE PRACTITIONER

## 2024-09-24 PROCEDURE — 36415 COLL VENOUS BLD VENIPUNCTURE: CPT | Performed by: NURSE PRACTITIONER

## 2024-09-24 PROCEDURE — 80053 COMPREHEN METABOLIC PANEL: CPT | Performed by: NURSE PRACTITIONER

## 2024-09-24 PROCEDURE — 1159F MED LIST DOCD IN RCRD: CPT | Performed by: NURSE PRACTITIONER

## 2024-09-24 PROCEDURE — 87086 URINE CULTURE/COLONY COUNT: CPT | Performed by: NURSE PRACTITIONER

## 2024-09-24 PROCEDURE — 83036 HEMOGLOBIN GLYCOSYLATED A1C: CPT | Performed by: NURSE PRACTITIONER

## 2024-09-24 PROCEDURE — G0439 PPPS, SUBSEQ VISIT: HCPCS | Performed by: NURSE PRACTITIONER

## 2024-09-24 PROCEDURE — 99214 OFFICE O/P EST MOD 30 MIN: CPT | Performed by: NURSE PRACTITIONER

## 2024-09-24 PROCEDURE — 1170F FXNL STATUS ASSESSED: CPT | Performed by: NURSE PRACTITIONER

## 2024-09-24 PROCEDURE — 81001 URINALYSIS AUTO W/SCOPE: CPT | Performed by: NURSE PRACTITIONER

## 2024-09-24 PROCEDURE — 1160F RVW MEDS BY RX/DR IN RCRD: CPT | Performed by: NURSE PRACTITIONER

## 2024-09-24 PROCEDURE — 80061 LIPID PANEL: CPT | Performed by: NURSE PRACTITIONER

## 2024-09-24 PROCEDURE — 87186 SC STD MICRODIL/AGAR DIL: CPT | Performed by: NURSE PRACTITIONER

## 2024-09-24 PROCEDURE — 85025 COMPLETE CBC W/AUTO DIFF WBC: CPT | Performed by: NURSE PRACTITIONER

## 2024-09-24 PROCEDURE — 87077 CULTURE AEROBIC IDENTIFY: CPT | Performed by: NURSE PRACTITIONER

## 2024-09-24 RX ORDER — MELOXICAM 7.5 MG/1
7.5 TABLET ORAL DAILY
Qty: 90 TABLET | Refills: 1 | Status: SHIPPED | OUTPATIENT
Start: 2024-09-24

## 2024-09-24 RX ORDER — ATORVASTATIN CALCIUM 40 MG/1
40 TABLET, FILM COATED ORAL DAILY
Qty: 90 TABLET | Refills: 1 | Status: SHIPPED | OUTPATIENT
Start: 2024-09-24

## 2024-09-24 RX ORDER — PROPRANOLOL HCL 10 MG
10 TABLET ORAL 2 TIMES DAILY
Qty: 180 TABLET | Refills: 1 | Status: SHIPPED | OUTPATIENT
Start: 2024-09-24

## 2024-09-26 LAB — BACTERIA SPEC AEROBE CULT: ABNORMAL

## 2025-01-23 ENCOUNTER — OFFICE VISIT (OUTPATIENT)
Dept: FAMILY MEDICINE CLINIC | Facility: CLINIC | Age: 75
End: 2025-01-23
Payer: MEDICARE

## 2025-01-23 VITALS
OXYGEN SATURATION: 99 % | BODY MASS INDEX: 24.01 KG/M2 | DIASTOLIC BLOOD PRESSURE: 62 MMHG | TEMPERATURE: 98 F | HEART RATE: 74 BPM | SYSTOLIC BLOOD PRESSURE: 110 MMHG | HEIGHT: 70 IN | WEIGHT: 167.7 LBS

## 2025-01-23 DIAGNOSIS — E11.9 TYPE 2 DIABETES MELLITUS WITHOUT COMPLICATION, WITHOUT LONG-TERM CURRENT USE OF INSULIN: ICD-10-CM

## 2025-01-23 DIAGNOSIS — R25.1 TREMOR OF RIGHT HAND: ICD-10-CM

## 2025-01-23 DIAGNOSIS — G25.0 BENIGN ESSENTIAL TREMOR: Primary | ICD-10-CM

## 2025-01-23 LAB
ALBUMIN SERPL-MCNC: 4.3 G/DL (ref 3.5–5.2)
ALBUMIN UR-MCNC: <1.2 MG/DL
ALBUMIN/GLOB SERPL: 1.8 G/DL
ALP SERPL-CCNC: 109 U/L (ref 39–117)
ALT SERPL W P-5'-P-CCNC: 18 U/L (ref 1–41)
ANION GAP SERPL CALCULATED.3IONS-SCNC: 12 MMOL/L (ref 5–15)
AST SERPL-CCNC: 16 U/L (ref 1–40)
BILIRUB SERPL-MCNC: 0.7 MG/DL (ref 0–1.2)
BUN SERPL-MCNC: 10 MG/DL (ref 8–23)
BUN/CREAT SERPL: 13.2 (ref 7–25)
CALCIUM SPEC-SCNC: 9.6 MG/DL (ref 8.6–10.5)
CHLORIDE SERPL-SCNC: 102 MMOL/L (ref 98–107)
CHOLEST SERPL-MCNC: 127 MG/DL (ref 0–200)
CO2 SERPL-SCNC: 25 MMOL/L (ref 22–29)
CREAT SERPL-MCNC: 0.76 MG/DL (ref 0.76–1.27)
CREAT UR-MCNC: 78.2 MG/DL
EGFRCR SERPLBLD CKD-EPI 2021: 94.9 ML/MIN/1.73
GLOBULIN UR ELPH-MCNC: 2.4 GM/DL
GLUCOSE SERPL-MCNC: 163 MG/DL (ref 65–99)
HBA1C MFR BLD: 7.6 % (ref 4.8–5.6)
HDLC SERPL-MCNC: 34 MG/DL (ref 40–60)
LDLC SERPL CALC-MCNC: 62 MG/DL (ref 0–100)
LDLC/HDLC SERPL: 1.65 {RATIO}
MICROALBUMIN/CREAT UR: NORMAL MG/G{CREAT}
POTASSIUM SERPL-SCNC: 4.2 MMOL/L (ref 3.5–5.2)
PROT SERPL-MCNC: 6.7 G/DL (ref 6–8.5)
SODIUM SERPL-SCNC: 139 MMOL/L (ref 136–145)
TRIGL SERPL-MCNC: 185 MG/DL (ref 0–150)
VLDLC SERPL-MCNC: 31 MG/DL (ref 5–40)

## 2025-01-23 PROCEDURE — 80053 COMPREHEN METABOLIC PANEL: CPT | Performed by: NURSE PRACTITIONER

## 2025-01-23 PROCEDURE — 1126F AMNT PAIN NOTED NONE PRSNT: CPT | Performed by: NURSE PRACTITIONER

## 2025-01-23 PROCEDURE — 83036 HEMOGLOBIN GLYCOSYLATED A1C: CPT | Performed by: NURSE PRACTITIONER

## 2025-01-23 PROCEDURE — 82570 ASSAY OF URINE CREATININE: CPT | Performed by: NURSE PRACTITIONER

## 2025-01-23 PROCEDURE — 80061 LIPID PANEL: CPT | Performed by: NURSE PRACTITIONER

## 2025-01-23 PROCEDURE — 1159F MED LIST DOCD IN RCRD: CPT | Performed by: NURSE PRACTITIONER

## 2025-01-23 PROCEDURE — 99214 OFFICE O/P EST MOD 30 MIN: CPT | Performed by: NURSE PRACTITIONER

## 2025-01-23 PROCEDURE — 1160F RVW MEDS BY RX/DR IN RCRD: CPT | Performed by: NURSE PRACTITIONER

## 2025-01-23 PROCEDURE — G2211 COMPLEX E/M VISIT ADD ON: HCPCS | Performed by: NURSE PRACTITIONER

## 2025-01-23 PROCEDURE — 82043 UR ALBUMIN QUANTITATIVE: CPT | Performed by: NURSE PRACTITIONER

## 2025-01-23 RX ORDER — PRIMIDONE 50 MG/1
25 TABLET ORAL NIGHTLY
Qty: 30 TABLET | Refills: 1 | Status: SHIPPED | OUTPATIENT
Start: 2025-01-23

## 2025-01-23 NOTE — PROGRESS NOTES
Chief Complaint  Hyperlipidemia, Diabetes, Tremors, and Polyarthralgia    The PHQ has not been completed during this encounter.       History of Present Illness  Twin Babcock is a 74 y.o. male who presents to [facility] with a past medical history of  Past Medical History:   Diagnosis Date    Allergic 1994    Arthritis     HANDS AND SHOULDER    Coronary artery disease     Diabetes mellitus     Essential tremor     right hand     HL (hearing loss) 1975    Hypercholesteremia     Hypertension     ST elevation myocardial infarction (STEMI)        HPI     The patient is a 74-year-old male who presents for evaluation of essential tremor.    He reports that his essential tremor, diagnosed by neurology, has been significantly impacting his daily activities, particularly those requiring fine motor skills such as ship making, eating, and buttoning clothing. The tremor has also hindered his ability to engage in hobbies he previously enjoyed, such as painting. He has made lifestyle modifications, including reducing his caffeine intake from 10 cups to half a cup per day. He occasionally uses nicotine gum and admits to inadequate sleep due to disturbances at night. He has considered surgical intervention for his hand condition but was deterred by the potential side effects. He has expressed interest in exploring alternative medications, such as primidone, for his hand condition. He is currently on propranolol.    He has made some dietary changes in response to an increase in his A1c levels. He has incorporated small fruit cups with no added sugar into his breakfast and has been attempting to avoid bread, although he finds this challenging. He has significantly reduced his potato intake and is trying to identify suitable vegetables for his diet. He consumes a large quantity of carrots.    SOCIAL HISTORY  He occasionally uses nicotine gum. He has significantly reduced his caffeine intake from  "10 cups to half a cup per day.    MEDICATIONS  propranolol       Objective   Vital Signs:   Vitals:    01/23/25 0925   BP: 110/62   BP Location: Left arm   Patient Position: Sitting   Pulse: 74   Temp: 98 °F (36.7 °C)   SpO2: 99%   Weight: 76.1 kg (167 lb 11.2 oz)   Height: 177.8 cm (70\")   PainSc: 0-No pain     Body mass index is 24.06 kg/m².    Wt Readings from Last 3 Encounters:   01/23/25 76.1 kg (167 lb 11.2 oz)   09/24/24 77.8 kg (171 lb 9.6 oz)   03/04/24 76.7 kg (169 lb)     BP Readings from Last 3 Encounters:   01/23/25 110/62   09/24/24 110/60   03/04/24 108/68       Health Maintenance   Topic Date Due    DIABETIC EYE EXAM  Never done    URINE MICROALBUMIN  03/04/2025    HEMOGLOBIN A1C  03/24/2025    ZOSTER VACCINE (1 of 2) 03/31/2025 (Originally 3/12/2001)    ANNUAL WELLNESS VISIT  09/24/2025    DIABETIC FOOT EXAM  09/24/2025    LIPID PANEL  09/24/2025    TDAP/TD VACCINES (2 - Td or Tdap) 10/02/2025    COLORECTAL CANCER SCREENING  08/17/2026    HEPATITIS C SCREENING  Completed    COVID-19 Vaccine  Completed    INFLUENZA VACCINE  Completed    Pneumococcal Vaccine 65+  Completed    AAA SCREEN ONCE  Completed       Physical Exam  Vitals reviewed.   Constitutional:       General: He is not in acute distress.     Appearance: Normal appearance. He is well-developed.   HENT:      Head: Normocephalic and atraumatic.   Eyes:      Conjunctiva/sclera: Conjunctivae normal.      Pupils: Pupils are equal, round, and reactive to light.   Cardiovascular:      Rate and Rhythm: Normal rate and regular rhythm.      Heart sounds: Normal heart sounds.   Pulmonary:      Effort: Pulmonary effort is normal.      Breath sounds: Normal breath sounds.   Musculoskeletal:      Cervical back: Neck supple.      Right lower leg: No edema.      Left lower leg: No edema.   Skin:     General: Skin is warm and dry.   Neurological:      Mental Status: He is alert and oriented to person, place, and time.      Motor: Tremor (of right hand) " present.   Psychiatric:         Mood and Affect: Mood and affect normal.         Behavior: Behavior normal.         Thought Content: Thought content normal.         Judgment: Judgment normal.            Result Review :  The following data was reviewed by: MARIANO Irving on 01/23/2025:  Results       Common labs          3/4/2024    08:37 3/4/2024    09:03 9/24/2024    11:00   Common Labs   Glucose 157   190    BUN 13   12    Creatinine 0.88   0.77    Sodium 137   136    Potassium 4.3   4.3    Chloride 101   104    Calcium 9.8   9.7    Albumin 4.6   4.0    Total Bilirubin 0.8   0.6    Alkaline Phosphatase 129   128    AST (SGOT) 18   15    ALT (SGPT) 21   24    WBC 10.05   9.50    Hemoglobin 16.3   15.3    Hematocrit 47.1   45.0    Platelets 312   300    Total Cholesterol 125   104    Triglycerides 267   166    HDL Cholesterol 31   28    LDL Cholesterol  52   48    Hemoglobin A1C 7.20   7.90    Microalbumin, Urine  <1.2       TSH          3/4/2024    08:37 9/24/2024    11:00   TSH   TSH 2.690  2.020        Procedures        Assessment and Plan   Diagnoses and all orders for this visit:    1. Benign essential tremor (Primary)  -     primidone (MYSOLINE) 50 MG tablet; Take 0.5 tablets by mouth Every Night.  Dispense: 30 tablet; Refill: 1    2. Tremor of right hand  -     primidone (MYSOLINE) 50 MG tablet; Take 0.5 tablets by mouth Every Night.  Dispense: 30 tablet; Refill: 1    3. Type 2 diabetes mellitus without complication, without long-term current use of insulin  -     CBC & Differential  -     Comprehensive Metabolic Panel  -     Hemoglobin A1c  -     Lipid Panel  -     Microalbumin / Creatinine Urine Ratio - Urine, Clean Catch         1. Essential tremor.  He reports difficulty with tasks requiring fine motor skills, such as buttoning and writing, and has reduced caffeine intake significantly. He is currently on propranolol. Primidone 50 mg, take 25mg (half a tablet) nightly, will be added to his  regimen. If symptoms persist after a month, the dosage will be increased to a full tablet of 50 mg. He is advised to continue propranolol and avoid nicotine and tobacco, which can worsen symptoms.    2. Elevated A1c.  He has made some dietary changes, including reducing sugar intake and limiting potatoes. He is advised to continue monitoring his diet, particularly reducing high-carb foods like corn and potatoes. Carrots are acceptable in moderation. Blood work and a urine sample have been ordered to monitor his condition.    Follow-up  The patient will follow up in 1 month.     BMI is within normal parameters. No other follow-up for BMI required.         FOLLOW UP  Return in about 4 weeks (around 2/20/2025) for Recheck.    Patient was given instructions and counseling regarding his condition or for health maintenance advice. Please see specific information pulled into the AVS if appropriate.       MARIANO Irving  01/23/25  11:23 EST    CURRENT & DISCONTINUED MEDICATIONS  Current Outpatient Medications   Medication Instructions    aspirin 81 MG tablet Daily    atorvastatin (LIPITOR) 40 mg, Oral, Daily    meloxicam (MOBIC) 7.5 mg, Oral, Daily    metFORMIN (GLUCOPHAGE) 1,000 mg, Oral, 2 Times Daily With Meals    primidone (MYSOLINE) 25 mg, Oral, Nightly    propranolol (INDERAL) 10 mg, Oral, 2 Times Daily       There are no discontinued medications.     Patient or patient representative verbalized consent for the use of Ambient Listening during the visit with  MARIANO Irving for chart documentation. 1/23/2025  11:22 EST

## 2025-02-21 ENCOUNTER — OFFICE VISIT (OUTPATIENT)
Dept: FAMILY MEDICINE CLINIC | Facility: CLINIC | Age: 75
End: 2025-02-21
Payer: MEDICARE

## 2025-02-21 VITALS
WEIGHT: 169.2 LBS | HEART RATE: 71 BPM | SYSTOLIC BLOOD PRESSURE: 120 MMHG | TEMPERATURE: 97.7 F | DIASTOLIC BLOOD PRESSURE: 64 MMHG | OXYGEN SATURATION: 94 % | BODY MASS INDEX: 24.22 KG/M2 | HEIGHT: 70 IN

## 2025-02-21 DIAGNOSIS — G25.0 BENIGN ESSENTIAL TREMOR: ICD-10-CM

## 2025-02-21 DIAGNOSIS — E11.9 TYPE 2 DIABETES MELLITUS WITHOUT COMPLICATION, WITHOUT LONG-TERM CURRENT USE OF INSULIN: ICD-10-CM

## 2025-02-21 DIAGNOSIS — R25.1 TREMOR OF RIGHT HAND: ICD-10-CM

## 2025-02-21 LAB
ALBUMIN UR-MCNC: <1.2 MG/DL
BASOPHILS # BLD AUTO: 0.12 10*3/MM3 (ref 0–0.2)
BASOPHILS NFR BLD AUTO: 1.2 % (ref 0–1.5)
CREAT UR-MCNC: 90.4 MG/DL
DEPRECATED RDW RBC AUTO: 38.6 FL (ref 37–54)
EOSINOPHIL # BLD AUTO: 0.57 10*3/MM3 (ref 0–0.4)
EOSINOPHIL NFR BLD AUTO: 5.8 % (ref 0.3–6.2)
ERYTHROCYTE [DISTWIDTH] IN BLOOD BY AUTOMATED COUNT: 12.1 % (ref 12.3–15.4)
HCT VFR BLD AUTO: 45.1 % (ref 37.5–51)
HGB BLD-MCNC: 15.8 G/DL (ref 13–17.7)
IMM GRANULOCYTES # BLD AUTO: 0.03 10*3/MM3 (ref 0–0.05)
IMM GRANULOCYTES NFR BLD AUTO: 0.3 % (ref 0–0.5)
LYMPHOCYTES # BLD AUTO: 3.81 10*3/MM3 (ref 0.7–3.1)
LYMPHOCYTES NFR BLD AUTO: 38.6 % (ref 19.6–45.3)
MCH RBC QN AUTO: 31.7 PG (ref 26.6–33)
MCHC RBC AUTO-ENTMCNC: 35 G/DL (ref 31.5–35.7)
MCV RBC AUTO: 90.4 FL (ref 79–97)
MICROALBUMIN/CREAT UR: NORMAL MG/G{CREAT}
MONOCYTES # BLD AUTO: 0.87 10*3/MM3 (ref 0.1–0.9)
MONOCYTES NFR BLD AUTO: 8.8 % (ref 5–12)
NEUTROPHILS NFR BLD AUTO: 4.48 10*3/MM3 (ref 1.7–7)
NEUTROPHILS NFR BLD AUTO: 45.3 % (ref 42.7–76)
NRBC BLD AUTO-RTO: 0 /100 WBC (ref 0–0.2)
PLATELET # BLD AUTO: 300 10*3/MM3 (ref 140–450)
PMV BLD AUTO: 9.6 FL (ref 6–12)
RBC # BLD AUTO: 4.99 10*6/MM3 (ref 4.14–5.8)
WBC NRBC COR # BLD AUTO: 9.88 10*3/MM3 (ref 3.4–10.8)

## 2025-02-21 PROCEDURE — 85025 COMPLETE CBC W/AUTO DIFF WBC: CPT | Performed by: NURSE PRACTITIONER

## 2025-02-21 PROCEDURE — 82570 ASSAY OF URINE CREATININE: CPT | Performed by: NURSE PRACTITIONER

## 2025-02-21 PROCEDURE — 82043 UR ALBUMIN QUANTITATIVE: CPT | Performed by: NURSE PRACTITIONER

## 2025-02-21 RX ORDER — PRIMIDONE 50 MG/1
50 TABLET ORAL 2 TIMES DAILY
Qty: 180 TABLET | Refills: 1 | Status: SHIPPED | OUTPATIENT
Start: 2025-02-21

## 2025-02-21 NOTE — PROGRESS NOTES
"Chief Complaint  Tremors (Follow up )    The PHQ has not been completed during this encounter.       History of Present Illness  Twin Babcock is a 74 y.o. male who presents to [facility] with a past medical history of  Past Medical History:   Diagnosis Date    Allergic 1994    Arthritis     HANDS AND SHOULDER    Coronary artery disease     Diabetes mellitus     Essential tremor     right hand     HL (hearing loss) 1975    Hypercholesteremia     Hypertension     ST elevation myocardial infarction (STEMI)        HPI     The patient is a 74-year-old male here to follow up on the new start of primidone for essential tremor.    He reports a noticeable improvement in his condition since starting primidone, with the ability to eat without dropping food being a significant milestone. However, he notes that the progress is not consistent on a daily basis. 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 He is currently taking half a tablet of primidone 25 mg at night, which he finds challenging due to the small size of the pills. He continues to take propranolol 10 mg twice daily and reports no side effects such as lightheadedness or dizziness.    MEDICATIONS  Current: Primidone, propranolol.       Objective   Vital Signs:   Vitals:    02/21/25 1021   BP: 120/64   BP Location: Left arm   Patient Position: Sitting   Pulse: 71   Temp: 97.7 °F (36.5 °C)   SpO2: 94%   Weight: 76.7 kg (169 lb 3.2 oz)   Height: 177.8 cm (70\")   PainSc: 0-No pain     Body mass index is 24.28 kg/m².    Wt Readings from Last 3 Encounters:   02/21/25 76.7 kg (169 lb 3.2 oz)   01/23/25 76.1 kg (167 lb 11.2 oz)   09/24/24 77.8 kg (171 lb 9.6 oz)     BP Readings from Last 3 Encounters:   02/21/25 120/64   01/23/25 110/62   09/24/24 110/60       Health Maintenance   Topic Date Due    DIABETIC EYE EXAM  Never done    URINE MICROALBUMIN-CREATININE RATIO (uACR)  Never done    " ZOSTER VACCINE (1 of 2) 03/31/2025 (Originally 3/12/2001)    HEMOGLOBIN A1C  07/23/2025    ANNUAL WELLNESS VISIT  09/24/2025    DIABETIC FOOT EXAM  09/24/2025    TDAP/TD VACCINES (2 - Td or Tdap) 10/02/2025    LIPID PANEL  01/23/2026    COLORECTAL CANCER SCREENING  08/17/2026    HEPATITIS C SCREENING  Completed    COVID-19 Vaccine  Completed    INFLUENZA VACCINE  Completed    Pneumococcal Vaccine 50+  Completed    AAA SCREEN ONCE  Completed       Physical Exam  Vitals reviewed.   Constitutional:       General: He is not in acute distress.     Appearance: Normal appearance. He is well-developed.   HENT:      Head: Normocephalic and atraumatic.      Right Ear: External ear normal.      Left Ear: External ear normal.   Eyes:      Conjunctiva/sclera: Conjunctivae normal.      Pupils: Pupils are equal, round, and reactive to light.   Cardiovascular:      Rate and Rhythm: Normal rate and regular rhythm.      Heart sounds: Normal heart sounds.   Pulmonary:      Effort: Pulmonary effort is normal.      Breath sounds: Normal breath sounds.   Musculoskeletal:      Cervical back: Neck supple.      Right lower leg: No edema.      Left lower leg: No edema.   Skin:     General: Skin is warm and dry.   Neurological:      Mental Status: He is alert and oriented to person, place, and time.      Motor: Tremor present.      Comments: Improved tremor of the right hand   Psychiatric:         Mood and Affect: Mood and affect normal.         Behavior: Behavior normal.         Thought Content: Thought content normal.         Judgment: Judgment normal.            Result Review :  The following data was reviewed by: MARIANO Irving on 02/21/2025:  Results       Common labs          3/4/2024    08:37 3/4/2024    09:03 9/24/2024    11:00 1/23/2025    10:01   Common Labs   Glucose 157   190  163    BUN 13   12  10    Creatinine 0.88   0.77  0.76    Sodium 137   136  139    Potassium 4.3   4.3  4.2    Chloride 101   104  102    Calcium  9.8   9.7  9.6    Albumin 4.6   4.0  4.3    Total Bilirubin 0.8   0.6  0.7    Alkaline Phosphatase 129   128  109    AST (SGOT) 18   15  16    ALT (SGPT) 21   24  18    WBC 10.05   9.50     Hemoglobin 16.3   15.3     Hematocrit 47.1   45.0     Platelets 312   300     Total Cholesterol 125   104  127    Triglycerides 267   166  185    HDL Cholesterol 31   28  34    LDL Cholesterol  52   48  62    Hemoglobin A1C 7.20   7.90  7.60    Microalbumin, Urine  <1.2   <1.2      TSH          3/4/2024    08:37 9/24/2024    11:00   TSH   TSH 2.690  2.020        Procedures        Assessment and Plan   Diagnoses and all orders for this visit:    1. Type 2 diabetes mellitus without complication, without long-term current use of insulin  -     Microalbumin / Creatinine Urine Ratio - Urine, Clean Catch    2. Tremor of right hand  -     primidone (MYSOLINE) 50 MG tablet; Take 1 tablet by mouth 2 (Two) Times a Day.  Dispense: 180 tablet; Refill: 1    3. Benign essential tremor  -     primidone (MYSOLINE) 50 MG tablet; Take 1 tablet by mouth 2 (Two) Times a Day.  Dispense: 180 tablet; Refill: 1         1. Essential tremor.  The patient has shown some improvement with the current dose of primidone 25 mg at night. He is advised to increase the dosage to one full tablet of primidone twice daily. He can start with one tablet at night and after a week, add the second tablet. The propranolol 10 mg twice daily will be discontinued to assess the effectiveness of primidone alone. He is instructed to report back in 1 to 2 months via [email or web address] message or phone call to evaluate the response to the increased dose of primidone without propranolol.    2. Diabetes mellitus.  A urine sample will be collected today to monitor kidney function related to diabetes.     BMI is within normal parameters. No other follow-up for BMI required.         FOLLOW UP  Return in about 3 months (around 5/21/2025) for Refills and fasting labs.    Patient was given  instructions and counseling regarding his condition or for health maintenance advice. Please see specific information pulled into the AVS if appropriate.       MARIANO Irving  02/21/25  12:01 EST    CURRENT & DISCONTINUED MEDICATIONS  Current Outpatient Medications   Medication Instructions    aspirin 81 MG tablet Daily    atorvastatin (LIPITOR) 40 mg, Oral, Daily    meloxicam (MOBIC) 7.5 mg, Oral, Daily    metFORMIN (GLUCOPHAGE) 1,000 mg, Oral, 2 Times Daily With Meals    primidone (MYSOLINE) 50 mg, Oral, 2 Times Daily       Medications Discontinued During This Encounter   Medication Reason    propranolol (INDERAL) 10 MG tablet Alternate therapy    primidone (MYSOLINE) 50 MG tablet Reorder        Patient or patient representative verbalized consent for the use of Ambient Listening during the visit with  MARIANO Irving for chart documentation. 2/21/2025  12:00 EST

## 2025-02-21 NOTE — PROGRESS NOTES
Venipuncture Blood Specimen Collection  Venipuncture performed in left arm  by Sulma Pang with good hemostasis. Patient tolerated the procedure well without complications.   02/21/25   Sulma Pang

## 2025-03-03 ENCOUNTER — OFFICE VISIT (OUTPATIENT)
Dept: FAMILY MEDICINE CLINIC | Facility: CLINIC | Age: 75
End: 2025-03-03
Payer: MEDICARE

## 2025-03-03 VITALS
DIASTOLIC BLOOD PRESSURE: 70 MMHG | HEART RATE: 80 BPM | WEIGHT: 168.1 LBS | OXYGEN SATURATION: 97 % | TEMPERATURE: 98 F | SYSTOLIC BLOOD PRESSURE: 120 MMHG | BODY MASS INDEX: 24.12 KG/M2
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
## 2025-03-03 PROCEDURE — 99213 OFFICE O/P EST LOW 20 MIN: CPT | Performed by: FAMILY MEDICINE

## 2025-03-03 PROCEDURE — 90715 TDAP VACCINE 7 YRS/> IM: CPT | Performed by: FAMILY MEDICINE

## 2025-03-03 PROCEDURE — 90471 IMMUNIZATION ADMIN: CPT | Performed by: FAMILY MEDICINE

## 2025-03-03 RX ORDER — SACCHAROMYCES BOULARDII 250 MG
250 CAPSULE ORAL 2 TIMES DAILY
Qty: 20 CAPSULE | Refills: 0 | Status: SHIPPED | OUTPATIENT
Start: 2025-03-03 | End: 2025-03-13

## 2025-03-03 NOTE — PROGRESS NOTES
Chief Complaint  Animal Bite (Happened 3-2-25)    Subjective          Twin Babcock presents to [facility]  History of Present Illness  Pt had police and animal control out at [employer] parking lot- the dogs were taken into custody- pt had bite to right hand  Pt had his dog on leash in [employer] parking lot yesterday when the dogs were on the loose and tried to attack him and his dog      BMI is within normal parameters. No other follow-up for BMI required.       Objective   Allergies   Allergen Reactions    Celecoxib Unknown - Low Severity    Metaxalone Unknown - Low Severity     Immunization History   Administered Date(s) Administered    COVID-19 (SAIDA) 05/14/2021    COVID-19 (MODERNA) 12YRS+ (SPIKEVAX) 10/11/2023, 10/28/2024    COVID-19 (MODERNA) 1st,2nd,3rd Dose Monovalent 11/15/2021    COVID-19 (MODERNA) BIVALENT 12+YRS 09/20/2022    COVID-19 (MODERNA) Monovalent Original Booster 09/20/2022    Fluzone High-Dose 65+YRS 10/28/2024    Fluzone High-Dose 65+yrs 11/25/2022, 10/11/2023    Influenza, Unspecified 09/13/2023    Pneumococcal Conjugate 20-Valent (PCV20) 10/13/2022    Pneumococcal Polysaccharide (PPSV23) 10/02/2015    Tdap 10/02/2015     Past Medical History:   Diagnosis Date    Allergic 1994    Arthritis     HANDS AND SHOULDER    Coronary artery disease     Diabetes mellitus     Essential tremor     right hand     HL (hearing loss) 1975    Hypercholesteremia     Hypertension     ST elevation myocardial infarction (STEMI)       Past Surgical History:   Procedure Laterality Date    CARDIAC CATHETERIZATION      CATARACT EXTRACTION, BILATERAL      COLONOSCOPY      COLONOSCOPY N/A 8/17/2021    Procedure: COLONOSCOPY WITH BIOPSIES, POLYPECTOMY;  Surgeon: Rajwinder Byrnes MD;  Location: [facility] ENDOSCOPY;  Service: Gastroenterology;  Laterality: N/A;  COLON POLYPS, HEMORRHOIDS    CORONARY STENT PLACEMENT      3.5 x 23 Xience in his proximal RCA    TONSILLECTOMY         Social History     Socioeconomic History    Marital status:     Number of children: 2   Tobacco Use    Smoking status: Former     Current packs/day: 0.00     Average packs/day: 1 pack/day for 5.0 years (5.0 ttl pk-yrs)     Types: Cigarettes     Start date: 3/4/1996     Quit date: 3/4/2000     Years since quittin.0     Passive exposure: Past    Smokeless tobacco: Never    Tobacco comments:     Daily caffeine use   Vaping Use    Vaping status: Never Used   Substance and Sexual Activity    Alcohol use: Yes     Comment: Rarly    Drug use: No    Sexual activity: Not Currently     Partners: Female        Current Outpatient Medications:     aspirin 81 MG tablet, Take  by mouth Daily., Disp: , Rfl:     atorvastatin (LIPITOR) 40 MG tablet, Take 1 tablet by mouth Daily., Disp: 90 tablet, Rfl: 1    meloxicam (Mobic) 7.5 MG tablet, Take 1 tablet by mouth Daily., Disp: 90 tablet, Rfl: 1    metFORMIN (GLUCOPHAGE) 500 MG tablet, Take 2 tablets by mouth 2 (Two) Times a Day With Meals., Disp: 360 tablet, Rfl: 1    primidone (MYSOLINE) 50 MG tablet, Take 1 tablet by mouth 2 (Two) Times a Day., Disp: 180 tablet, Rfl: 1    amoxicillin-clavulanate (AUGMENTIN) 875-125 MG per tablet, Take 1 tablet by mouth 2 (Two) Times a Day for 7 days., Disp: 14 tablet, Rfl: 0    saccharomyces boulardii (Florastor) 250 MG capsule, Take 1 capsule by mouth 2 (Two) Times a Day for 10 days., Disp: 20 capsule, Rfl: 0   Family History   Problem Relation Age of Onset    No Known Problems Mother     No Known Problems Father     Malig Hyperthermia Neg Hx           Vital Signs:   Vitals:    25 1444   BP: 120/70   Pulse: 80   Temp: 98 °F (36.7 °C)   SpO2: 97%   Weight: 76.2 kg (168 lb 1.6 oz)       Review of Systems   Constitutional:  Negative for fatigue and fever.   HENT:  Negative for sore throat.    Eyes:  Negative for visual disturbance.   Respiratory:  Negative for apnea, cough, shortness of breath and wheezing.    Cardiovascular:   Negative for chest pain, palpitations and leg swelling.   Gastrointestinal:  Negative for abdominal pain, diarrhea, nausea and vomiting.   Neurological:  Negative for dizziness, light-headedness and headaches.      Physical Exam  Vitals reviewed.   Constitutional:       Appearance: Normal appearance. He is well-developed.   HENT:      Head: Normocephalic and atraumatic.      Right Ear: External ear normal.      Left Ear: External ear normal.      Mouth/Throat:      Pharynx: No oropharyngeal exudate.   Eyes:      Conjunctiva/sclera: Conjunctivae normal.      Pupils: Pupils are equal, round, and reactive to light.   Cardiovascular:      Rate and Rhythm: Normal rate and regular rhythm.      Pulses: Normal pulses.      Heart sounds: Normal heart sounds. No murmur heard.     No friction rub. No gallop.   Pulmonary:      Effort: Pulmonary effort is normal.      Breath sounds: Normal breath sounds. No wheezing or rhonchi.   Abdominal:      General: Abdomen is flat. Bowel sounds are normal. There is no distension.      Palpations: Abdomen is soft. There is no mass.      Tenderness: There is no abdominal tenderness. There is no guarding or rebound.      Hernia: No hernia is present.   Musculoskeletal:         General: Normal range of motion.   Skin:     General: Skin is warm and dry.      Capillary Refill: Capillary refill takes less than 2 seconds.      Comments: Pt has closed scratches and bite marks on right hand, no redness, warmth, swelling, or bruising.   Neurological:      General: No focal deficit present.      Mental Status: He is alert and oriented to person, place, and time.      Cranial Nerves: No cranial nerve deficit.   Psychiatric:         Mood and Affect: Mood and affect normal.         Behavior: Behavior normal.         Thought Content: Thought content normal.         Judgment: Judgment normal.        Result Review :                 Assessment and Plan    Diagnoses and all orders for this visit:    1. Dog  bite, initial encounter (Primary)  -     Tdap Vaccine => 6yo IM (BOOSTRIX/ADACEL)  -     amoxicillin-clavulanate (AUGMENTIN) 875-125 MG per tablet; Take 1 tablet by mouth 2 (Two) Times a Day for 7 days.  Dispense: 14 tablet; Refill: 0  -     saccharomyces boulardii (Florastor) 250 MG capsule; Take 1 capsule by mouth 2 (Two) Times a Day for 10 days.  Dispense: 20 capsule; Refill: 0            Follow Up   Return if symptoms worsen or fail to improve.  Patient was given instructions and counseling regarding his condition or for health maintenance advice. Please see specific information pulled into the AVS if appropriate.

## 2025-03-17 DIAGNOSIS — E11.9 TYPE 2 DIABETES MELLITUS WITHOUT COMPLICATION, WITHOUT LONG-TERM CURRENT USE OF INSULIN: ICD-10-CM

## 2025-03-17 DIAGNOSIS — E78.5 HYPERLIPIDEMIA, UNSPECIFIED HYPERLIPIDEMIA TYPE: ICD-10-CM

## 2025-03-17 DIAGNOSIS — R25.1 TREMOR OF RIGHT HAND: ICD-10-CM

## 2025-03-17 RX ORDER — ATORVASTATIN CALCIUM 40 MG/1
40 TABLET, FILM COATED ORAL DAILY
Qty: 90 TABLET | Refills: 1 | Status: SHIPPED | OUTPATIENT
Start: 2025-03-17

## 2025-03-17 RX ORDER — PROPRANOLOL HYDROCHLORIDE 10 MG/1
10 TABLET ORAL 2 TIMES DAILY
Qty: 180 TABLET | Refills: 1 | OUTPATIENT
Start: 2025-03-17

## 2025-03-25 ENCOUNTER — OFFICE VISIT (OUTPATIENT)
Dept: FAMILY MEDICINE CLINIC | Facility: CLINIC | Age: 75
End: 2025-03-25
Payer: MEDICARE

## 2025-03-25 ENCOUNTER — HOSPITAL ENCOUNTER (OUTPATIENT)
Dept: MRI IMAGING | Facility: HOSPITAL | Age: 75
Discharge: HOME | End: 2025-03-25
Admitting: FAMILY MEDICINE
Payer: MEDICARE

## 2025-03-25 ENCOUNTER — RESULTS FOLLOW-UP (OUTPATIENT)
Dept: FAMILY MEDICINE CLINIC | Facility: CLINIC | Age: 75
End: 2025-03-25

## 2025-03-25 VITALS
HEART RATE: 67 BPM | TEMPERATURE: 98.4 F | WEIGHT: 166 LBS | DIASTOLIC BLOOD PRESSURE: 64 MMHG | OXYGEN SATURATION: 94 % | BODY MASS INDEX: 23.77 KG/M2 | HEIGHT: 70 IN | SYSTOLIC BLOOD PRESSURE: 104 MMHG

## 2025-03-25 DIAGNOSIS — R25.1 TREMOR OF RIGHT HAND: ICD-10-CM

## 2025-03-25 DIAGNOSIS — J06.9 UPPER RESPIRATORY TRACT INFECTION, UNSPECIFIED TYPE: Primary | ICD-10-CM

## 2025-03-25 DIAGNOSIS — R42 DIZZINESS: ICD-10-CM

## 2025-03-25 DIAGNOSIS — R90.89 ABNORMAL BRAIN MRI: Primary | ICD-10-CM

## 2025-03-25 DIAGNOSIS — R53.83 FATIGUE, UNSPECIFIED TYPE: ICD-10-CM

## 2025-03-25 DIAGNOSIS — J02.9 PHARYNGITIS, UNSPECIFIED ETIOLOGY: ICD-10-CM

## 2025-03-25 DIAGNOSIS — U07.1 COVID-19 VIRUS INFECTION: ICD-10-CM

## 2025-03-25 LAB
ALBUMIN SERPL-MCNC: 4.2 G/DL (ref 3.5–5.2)
ALBUMIN/GLOB SERPL: 1.5 G/DL
ALP SERPL-CCNC: 121 U/L (ref 39–117)
ALT SERPL W P-5'-P-CCNC: 23 U/L (ref 1–41)
ANION GAP SERPL CALCULATED.3IONS-SCNC: 13.1 MMOL/L (ref 5–15)
AST SERPL-CCNC: 22 U/L (ref 1–40)
BASOPHILS # BLD AUTO: 0.03 10*3/MM3 (ref 0–0.2)
BASOPHILS NFR BLD AUTO: 0.5 % (ref 0–1.5)
BILIRUB SERPL-MCNC: 0.5 MG/DL (ref 0–1.2)
BUN SERPL-MCNC: 8 MG/DL (ref 8–23)
BUN/CREAT SERPL: 9.8 (ref 7–25)
CALCIUM SPEC-SCNC: 10 MG/DL (ref 8.6–10.5)
CHLORIDE SERPL-SCNC: 97 MMOL/L (ref 98–107)
CO2 SERPL-SCNC: 23.9 MMOL/L (ref 22–29)
CREAT BLDA-MCNC: 0.8 MG/DL (ref 0.6–1.3)
CREAT SERPL-MCNC: 0.82 MG/DL (ref 0.76–1.27)
DEPRECATED RDW RBC AUTO: 39.8 FL (ref 37–54)
EGFRCR SERPLBLD CKD-EPI 2021: 92.2 ML/MIN/1.73
EGFRCR SERPLBLD CKD-EPI 2021: 92.9 ML/MIN/1.73
EOSINOPHIL # BLD AUTO: 0.06 10*3/MM3 (ref 0–0.4)
EOSINOPHIL NFR BLD AUTO: 1 % (ref 0.3–6.2)
ERYTHROCYTE [DISTWIDTH] IN BLOOD BY AUTOMATED COUNT: 12.1 % (ref 12.3–15.4)
EXPIRATION DATE: ABNORMAL
EXPIRATION DATE: NORMAL
FLUAV AG UPPER RESP QL IA.RAPID: NOT DETECTED
FLUBV AG UPPER RESP QL IA.RAPID: NOT DETECTED
FOLATE SERPL-MCNC: 14.9 NG/ML (ref 4.78–24.2)
GLOBULIN UR ELPH-MCNC: 2.8 GM/DL
GLUCOSE SERPL-MCNC: 173 MG/DL (ref 65–99)
HCT VFR BLD AUTO: 46.5 % (ref 37.5–51)
HGB BLD-MCNC: 15.9 G/DL (ref 13–17.7)
IMM GRANULOCYTES # BLD AUTO: 0.03 10*3/MM3 (ref 0–0.05)
IMM GRANULOCYTES NFR BLD AUTO: 0.5 % (ref 0–0.5)
INTERNAL CONTROL: ABNORMAL
INTERNAL CONTROL: NORMAL
LYMPHOCYTES # BLD AUTO: 1.82 10*3/MM3 (ref 0.7–3.1)
LYMPHOCYTES NFR BLD AUTO: 29.3 % (ref 19.6–45.3)
Lab: 389
Lab: ABNORMAL
MAGNESIUM SERPL-MCNC: 1.7 MG/DL (ref 1.6–2.4)
MCH RBC QN AUTO: 30.9 PG (ref 26.6–33)
MCHC RBC AUTO-ENTMCNC: 34.2 G/DL (ref 31.5–35.7)
MCV RBC AUTO: 90.3 FL (ref 79–97)
MONOCYTES # BLD AUTO: 0.91 10*3/MM3 (ref 0.1–0.9)
MONOCYTES NFR BLD AUTO: 14.6 % (ref 5–12)
NEUTROPHILS NFR BLD AUTO: 3.37 10*3/MM3 (ref 1.7–7)
NEUTROPHILS NFR BLD AUTO: 54.1 % (ref 42.7–76)
NRBC BLD AUTO-RTO: 0 /100 WBC (ref 0–0.2)
PLATELET # BLD AUTO: 216 10*3/MM3 (ref 140–450)
PMV BLD AUTO: 9.7 FL (ref 6–12)
POTASSIUM SERPL-SCNC: 4.2 MMOL/L (ref 3.5–5.2)
PROT SERPL-MCNC: 7 G/DL (ref 6–8.5)
RBC # BLD AUTO: 5.15 10*6/MM3 (ref 4.14–5.8)
S PYO AG THROAT QL: NEGATIVE
SARS-COV-2 AG UPPER RESP QL IA.RAPID: DETECTED
SODIUM SERPL-SCNC: 134 MMOL/L (ref 136–145)
T4 FREE SERPL-MCNC: 1.2 NG/DL (ref 0.92–1.68)
TSH SERPL DL<=0.05 MIU/L-ACNC: 1.5 UIU/ML (ref 0.27–4.2)
VIT B12 BLD-MCNC: 157 PG/ML (ref 211–946)
WBC NRBC COR # BLD AUTO: 6.22 10*3/MM3 (ref 3.4–10.8)

## 2025-03-25 PROCEDURE — 85025 COMPLETE CBC W/AUTO DIFF WBC: CPT | Performed by: FAMILY MEDICINE

## 2025-03-25 PROCEDURE — 80053 COMPREHEN METABOLIC PANEL: CPT | Performed by: FAMILY MEDICINE

## 2025-03-25 PROCEDURE — A9577 INJ MULTIHANCE: HCPCS | Performed by: FAMILY MEDICINE

## 2025-03-25 PROCEDURE — 86235 NUCLEAR ANTIGEN ANTIBODY: CPT | Performed by: FAMILY MEDICINE

## 2025-03-25 PROCEDURE — 86038 ANTINUCLEAR ANTIBODIES: CPT | Performed by: FAMILY MEDICINE

## 2025-03-25 PROCEDURE — 82565 ASSAY OF CREATININE: CPT

## 2025-03-25 PROCEDURE — 82607 VITAMIN B-12: CPT | Performed by: FAMILY MEDICINE

## 2025-03-25 PROCEDURE — 25510000002 GADOBENATE DIMEGLUMINE 529 MG/ML SOLUTION: Performed by: FAMILY MEDICINE

## 2025-03-25 PROCEDURE — 70553 MRI BRAIN STEM W/O & W/DYE: CPT

## 2025-03-25 PROCEDURE — 80188 ASSAY OF PRIMIDONE: CPT | Performed by: FAMILY MEDICINE

## 2025-03-25 PROCEDURE — 86225 DNA ANTIBODY NATIVE: CPT | Performed by: FAMILY MEDICINE

## 2025-03-25 PROCEDURE — 83735 ASSAY OF MAGNESIUM: CPT | Performed by: FAMILY MEDICINE

## 2025-03-25 PROCEDURE — 84443 ASSAY THYROID STIM HORMONE: CPT | Performed by: FAMILY MEDICINE

## 2025-03-25 PROCEDURE — 82746 ASSAY OF FOLIC ACID SERUM: CPT | Performed by: FAMILY MEDICINE

## 2025-03-25 PROCEDURE — 80184 ASSAY OF PHENOBARBITAL: CPT | Performed by: FAMILY MEDICINE

## 2025-03-25 PROCEDURE — 84439 ASSAY OF FREE THYROXINE: CPT | Performed by: FAMILY MEDICINE

## 2025-03-25 RX ORDER — SACCHAROMYCES BOULARDII 250 MG
250 CAPSULE ORAL 2 TIMES DAILY
Qty: 20 CAPSULE | Refills: 0 | Status: SHIPPED | OUTPATIENT
Start: 2025-03-25 | End: 2025-04-04

## 2025-03-25 RX ADMIN — GADOBENATE DIMEGLUMINE 15 ML: 529 INJECTION, SOLUTION INTRAVENOUS at 12:59

## 2025-03-25 NOTE — PROGRESS NOTES
Venipuncture Blood Specimen Collection  Venipuncture performed in left arm by Sulma Pang MA with good hemostasis. Patient tolerated the procedure well without complications.   03/25/25   Mary Mike MA

## 2025-03-25 NOTE — PROGRESS NOTES
Please call pt: I tried to call and had to leave message.  Let pt know that his covid test came back positive.  It had not finished running when he left.  Let him know that I apologize and thought that it had, but it was just the strep that was negative. I tried to call him but got his voice mail and I left a message.   His flu is negative.  He is to finish the treatment that I prescribed, however.  He would not be able to have the paxlovid for the covid infection because it would interact with his primidone and he should not just stop his primidone without Debbie weaning him off anyway.  He is to follow-up if he worsens at all.  Let me know if he has any questions.

## 2025-03-25 NOTE — PROGRESS NOTES
Chief Complaint  Tingling (Body feels tingly and jittery x three days. Was advised by nurse to see PCP )    Subjective          Twin Babcock presents to [facility]  History of Present Illness  Pt had some dizziness, felt clammy, jittery just today- pt doing better now- is feeling weak- episode lasted 5-6 minutes- pt had vertigo and could not get up    Pt is going to move in next month  URI   This is a new problem. The current episode started in the past 7 days. The problem has been unchanged. The maximum temperature recorded prior to his arrival was 100.4 - 100.9 F. Associated symptoms include congestion, coughing and joint pain. Pertinent negatives include no abdominal pain, chest pain, diarrhea, dysuria, ear pain, headaches, joint swelling, nausea, neck pain, plugged ear sensation, rash, rhinorrhea, sinus pain, sneezing, sore throat, swollen glands, vomiting or wheezing. He has tried nothing for the symptoms.              Objective   Allergies   Allergen Reactions    Celecoxib Unknown - Low Severity    Metaxalone Unknown - Low Severity     Immunization History   Administered Date(s) Administered    COVID-19 (SAIDA) 05/14/2021    COVID-19 (MODERNA) 12YRS+ (SPIKEVAX) 10/11/2023, 10/28/2024    COVID-19 (MODERNA) 1st,2nd,3rd Dose Monovalent 11/15/2021    COVID-19 (MODERNA) BIVALENT 12+YRS 09/20/2022    COVID-19 (MODERNA) Monovalent Original Booster 09/20/2022    Fluzone High-Dose 65+YRS 10/28/2024    Fluzone High-Dose 65+yrs 11/25/2022, 10/11/2023    Influenza, Unspecified 09/13/2023    Pneumococcal Conjugate 20-Valent (PCV20) 10/13/2022    Pneumococcal Polysaccharide (PPSV23) 10/02/2015    Tdap 10/02/2015, 03/03/2025     Past Medical History:   Diagnosis Date    Allergic 1994    Arthritis     HANDS AND SHOULDER    Coronary artery disease     Diabetes mellitus     Essential tremor     right hand     HL (hearing loss) 1975    Hypercholesteremia     Hypertension     ST elevation  myocardial infarction (STEMI)       Past Surgical History:   Procedure Laterality Date    CARDIAC CATHETERIZATION      CATARACT EXTRACTION, BILATERAL      COLONOSCOPY      COLONOSCOPY N/A 2021    Procedure: COLONOSCOPY WITH BIOPSIES, POLYPECTOMY;  Surgeon: Rajwinder Byrnes MD;  Location: [facility];  Service: Gastroenterology;  Laterality: N/A;  COLON POLYPS, HEMORRHOIDS    CORONARY STENT PLACEMENT      3.5 x 23 Xience in his proximal RCA    TONSILLECTOMY        Social History     Socioeconomic History    Marital status:     Number of children: 2   Tobacco Use    Smoking status: Former     Current packs/day: 0.00     Average packs/day: 1 pack/day for 5.0 years (5.0 ttl pk-yrs)     Types: Cigarettes     Start date: 3/4/1996     Quit date: 3/4/2000     Years since quittin.0     Passive exposure: Past    Smokeless tobacco: Never    Tobacco comments:     Daily caffeine use   Vaping Use    Vaping status: Never Used   Substance and Sexual Activity    Alcohol use: Yes     Comment: Rarly    Drug use: No    Sexual activity: Not Currently     Partners: Female        Current Outpatient Medications:     aspirin 81 MG tablet, Take  by mouth Daily., Disp: , Rfl:     atorvastatin (LIPITOR) 40 MG tablet, TAKE 1 TABLET BY MOUTH DAILY, Disp: 90 tablet, Rfl: 1    meloxicam (Mobic) 7.5 MG tablet, Take 1 tablet by mouth Daily., Disp: 90 tablet, Rfl: 1    primidone (MYSOLINE) 50 MG tablet, Take 1 tablet by mouth 2 (Two) Times a Day., Disp: 180 tablet, Rfl: 1    amoxicillin-clavulanate (AUGMENTIN) 875-125 MG per tablet, Take 1 tablet by mouth 2 (Two) Times a Day for 7 days., Disp: 14 tablet, Rfl: 0    saccharomyces boulardii (Florastor) 250 MG capsule, Take 1 capsule by mouth 2 (Two) Times a Day for 10 days., Disp: 20 capsule, Rfl: 0   Family History   Problem Relation Age of Onset    No Known Problems Mother     No Known Problems Father     Malig Hyperthermia Neg Hx           Vital Signs:   Vitals:     "03/25/25 1108   BP: 104/64   BP Location: Left arm   Pulse: 67   Temp: 98.4 °F (36.9 °C)   TempSrc: Temporal   SpO2: 94%   Weight: 75.3 kg (166 lb)   Height: 177.8 cm (70\")       Review of Systems   Constitutional:  Positive for fatigue.   HENT:  Positive for congestion. Negative for ear pain, rhinorrhea, sinus pain, sneezing and sore throat.    Eyes:  Negative for visual disturbance.   Respiratory:  Positive for cough. Negative for apnea, shortness of breath and wheezing.    Cardiovascular:  Negative for chest pain, palpitations and leg swelling.   Gastrointestinal:  Negative for abdominal pain, diarrhea, nausea and vomiting.   Genitourinary:  Negative for dysuria.   Musculoskeletal:  Positive for arthralgias and joint pain. Negative for neck pain.   Skin:  Negative for rash.   Neurological:  Positive for dizziness and tremors. Negative for seizures, syncope, speech difficulty, light-headedness, numbness and headaches.      Physical Exam  Vitals reviewed.   Constitutional:       Appearance: Normal appearance. He is well-developed.   HENT:      Head: Normocephalic and atraumatic.      Right Ear: Tympanic membrane, ear canal and external ear normal.      Left Ear: Tympanic membrane, ear canal and external ear normal.      Nose: Nose normal.      Mouth/Throat:      Mouth: Mucous membranes are moist.      Pharynx: Oropharynx is clear. Posterior oropharyngeal erythema present. No oropharyngeal exudate.   Eyes:      Extraocular Movements: Extraocular movements intact.      Conjunctiva/sclera: Conjunctivae normal.      Pupils: Pupils are equal, round, and reactive to light.   Cardiovascular:      Rate and Rhythm: Normal rate and regular rhythm.      Pulses: Normal pulses.      Heart sounds: Normal heart sounds. No murmur heard.     No friction rub. No gallop.   Pulmonary:      Effort: Pulmonary effort is normal.      Breath sounds: Normal breath sounds. No wheezing or rhonchi.   Abdominal:      General: Abdomen is flat. " Bowel sounds are normal. There is no distension.      Palpations: Abdomen is soft. There is no mass.      Tenderness: There is no abdominal tenderness. There is no guarding or rebound.      Hernia: No hernia is present.   Musculoskeletal:         General: Normal range of motion.      Cervical back: Normal range of motion and neck supple.   Skin:     General: Skin is warm and dry.      Capillary Refill: Capillary refill takes less than 2 seconds.   Neurological:      General: No focal deficit present.      Mental Status: He is alert and oriented to person, place, and time.      Cranial Nerves: No cranial nerve deficit.   Psychiatric:         Mood and Affect: Mood and affect normal.         Behavior: Behavior normal.         Thought Content: Thought content normal.         Judgment: Judgment normal.        Result Review :   The following data was reviewed by: Raúl Manuel MD on 03/25/2025:  CMP          9/24/2024    11:00 1/23/2025    10:01   CMP   Glucose 190  163    BUN 12  10    Creatinine 0.77  0.76    EGFR 94.5  94.9    Sodium 136  139    Potassium 4.3  4.2    Chloride 104  102    Calcium 9.7  9.6    Total Protein 6.7  6.7    Albumin 4.0  4.3    Globulin 2.7  2.4    Total Bilirubin 0.6  0.7    Alkaline Phosphatase 128  109    AST (SGOT) 15  16    ALT (SGPT) 24  18    Albumin/Globulin Ratio 1.5  1.8    BUN/Creatinine Ratio 15.6  13.2    Anion Gap 9.5  12.0      CBC          9/24/2024    11:00 2/21/2025    10:46   CBC   WBC 9.50  9.88    RBC 4.88  4.99    Hemoglobin 15.3  15.8    Hematocrit 45.0  45.1    MCV 92.2  90.4    MCH 31.4  31.7    MCHC 34.0  35.0    RDW 12.5  12.1    Platelets 300  300      Lipid Panel          9/24/2024    11:00 1/23/2025    10:01   Lipid Panel   Total Cholesterol 104  127    Triglycerides 166  185    HDL Cholesterol 28  34    VLDL Cholesterol 28  31    LDL Cholesterol  48  62    LDL/HDL Ratio 1.53  1.65      TSH          9/24/2024    11:00   TSH   TSH 2.020                Assessment  and Plan    Diagnoses and all orders for this visit:    1. Upper respiratory tract infection, unspecified type (Primary)  -     POCT SARS-CoV-2 Antigen BALDEMAR + Flu  -     amoxicillin-clavulanate (AUGMENTIN) 875-125 MG per tablet; Take 1 tablet by mouth 2 (Two) Times a Day for 7 days.  Dispense: 14 tablet; Refill: 0  -     saccharomyces boulardii (Florastor) 250 MG capsule; Take 1 capsule by mouth 2 (Two) Times a Day for 10 days.  Dispense: 20 capsule; Refill: 0    2. Dizziness  -     Duplex Carotid Ultrasound CAR; Future  -     Cancel: MRI Brain With & Without Contrast; Future  -     Primidone level  -     CBC Auto Differential  -     Comprehensive Metabolic Panel  -     Magnesium  -     TSH+Free T4  -     Vitamin B12 & Folate  -     MRI Brain With & Without Contrast; Future  -     LONNIE With / DsDNA, RNP, Sjogrens A / B, Smith    3. Fatigue, unspecified type  -     Primidone level  -     CBC Auto Differential  -     Comprehensive Metabolic Panel  -     Magnesium  -     TSH+Free T4  -     Vitamin B12 & Folate  -     LONNIE With / DsDNA, RNP, Sjogrens A / B, Manuel    4. Pharyngitis, unspecified etiology  -     POCT rapid strep A    5. COVID-19 virus infection    Primidone will interact with paxlovid so will hold off on prescribing paxlovid at this time.        Follow Up   Return in about 1 week (around 4/1/2025) for Recheck- need to see Herber for his tremors- make appt with herber.  Patient was given instructions and counseling regarding his condition or for health maintenance advice. Please see specific information pulled into the AVS if appropriate.

## 2025-03-26 LAB
ANA SER QL: POSITIVE
CENTROMERE B AB SER-ACNC: <0.2 AI (ref 0–0.9)
CHROMATIN AB SERPL-ACNC: <0.2 AI (ref 0–0.9)
DSDNA AB SER-ACNC: 13 IU/ML (ref 0–9)
ENA JO1 AB SER-ACNC: <0.2 AI (ref 0–0.9)
ENA RNP AB SER-ACNC: <0.2 AI (ref 0–0.9)
ENA SCL70 AB SER-ACNC: <0.2 AI (ref 0–0.9)
ENA SM AB SER-ACNC: <0.2 AI (ref 0–0.9)
ENA SS-A AB SER-ACNC: <0.2 AI (ref 0–0.9)
ENA SS-B AB SER-ACNC: <0.2 AI (ref 0–0.9)
Lab: ABNORMAL

## 2025-03-27 DIAGNOSIS — R42 DIZZINESS: ICD-10-CM

## 2025-03-27 DIAGNOSIS — R90.89 ABNORMAL BRAIN MRI: Primary | ICD-10-CM

## 2025-03-27 LAB
PHENOBARB SERPL-MCNC: ABNORMAL UG/ML (ref 15–40)
PRIMIDONE SERPL-MCNC: 2.7 UG/ML (ref 5–12)

## 2025-04-01 ENCOUNTER — OFFICE VISIT (OUTPATIENT)
Dept: FAMILY MEDICINE CLINIC | Facility: CLINIC | Age: 75
End: 2025-04-01
Payer: MEDICARE

## 2025-04-01 VITALS
TEMPERATURE: 98.9 F | WEIGHT: 162.1 LBS | OXYGEN SATURATION: 98 % | HEART RATE: 78 BPM | DIASTOLIC BLOOD PRESSURE: 52 MMHG | HEIGHT: 70 IN | BODY MASS INDEX: 23.21 KG/M2 | SYSTOLIC BLOOD PRESSURE: 102 MMHG

## 2025-04-01 DIAGNOSIS — E53.8 VITAMIN B12 DEFICIENCY: Primary | ICD-10-CM

## 2025-04-01 DIAGNOSIS — R90.89 ABNORMAL BRAIN MRI: ICD-10-CM

## 2025-04-01 DIAGNOSIS — R25.1 TREMOR OF RIGHT HAND: ICD-10-CM

## 2025-04-01 DIAGNOSIS — R53.83 FATIGUE, UNSPECIFIED TYPE: ICD-10-CM

## 2025-04-01 DIAGNOSIS — R76.8 ELEVATED ANTINUCLEAR ANTIBODY (ANA) LEVEL: ICD-10-CM

## 2025-04-01 PROCEDURE — 1160F RVW MEDS BY RX/DR IN RCRD: CPT | Performed by: NURSE PRACTITIONER

## 2025-04-01 PROCEDURE — 99214 OFFICE O/P EST MOD 30 MIN: CPT | Performed by: NURSE PRACTITIONER

## 2025-04-01 PROCEDURE — 3051F HG A1C>EQUAL 7.0%<8.0%: CPT | Performed by: NURSE PRACTITIONER

## 2025-04-01 PROCEDURE — 1126F AMNT PAIN NOTED NONE PRSNT: CPT | Performed by: NURSE PRACTITIONER

## 2025-04-01 PROCEDURE — 1159F MED LIST DOCD IN RCRD: CPT | Performed by: NURSE PRACTITIONER

## 2025-04-01 RX ORDER — CYANOCOBALAMIN 1000 UG/ML
INJECTION, SOLUTION INTRAMUSCULAR; SUBCUTANEOUS
Qty: 3 ML | Refills: 1 | Status: SHIPPED | OUTPATIENT
Start: 2025-04-01 | End: 2025-07-31

## 2025-04-01 NOTE — PROGRESS NOTES
Chief Complaint  Tremors (Follow up ) and Fatigue (Weak, shaky. Tested positive for covid on 3-23-25 and is still feeling bad. )    The PHQ has not been completed during this encounter.       History of Present Illness  Twin Babcock is a 75 y.o. male who presents to [facility] with a past medical history of  Past Medical History:   Diagnosis Date    Allergic 1994    Arthritis     HANDS AND SHOULDER    Coronary artery disease     Diabetes mellitus     Essential tremor     right hand     HL (hearing loss) 1975    Hypercholesteremia     Hypertension     ST elevation myocardial infarction (STEMI)        HPI     The patient is a 75-year-old male here to follow up on recent COVID-19 infection, fatigue, and tremors.    He has been experiencing generalized weakness and tremors, which he attributes to a recent syncopal episode. Despite a slight improvement in his condition, he continues to experience significant fatigue. He has no known history of autoimmune or connective tissue disease. He also reports joint pain. He has received a letter from Dr. Manuel regarding abnormal laboratory results, the specifics of which he is uncertain.    An MRI scan revealed a lesion in his brain, which he suspects may be the cause of his hand tremors. He has been referred to neurosurgery by Dr. Manuel due to a cystic lesion in the right temporal lobe but has not yet scheduled an appointment. He is currently on primidone for his hand tremors but is uncertain of its efficacy. He reports that his tremors were less severe during his COVID-19 infection. He believes that his condition improved when he was on both medications and is considering resuming this regimen.    He has expressed interest in dietary modifications to increase his B12 levels and is open to the possibility of B12 injections.    Supplemental Information  He had cataract surgery in the past.    MEDICATIONS  Current: Primidone       Objective  "  Vital Signs:   Vitals:    04/01/25 0934   BP: 102/52   BP Location: Left arm   Patient Position: Sitting   Pulse: 78   Temp: 98.9 °F (37.2 °C)   SpO2: 98%   Weight: 73.5 kg (162 lb 1.6 oz)   Height: 177.8 cm (70\")   PainSc: 0-No pain     Body mass index is 23.26 kg/m².    Wt Readings from Last 3 Encounters:   04/01/25 73.5 kg (162 lb 1.6 oz)   03/25/25 75.3 kg (166 lb)   03/03/25 76.2 kg (168 lb 1.6 oz)     BP Readings from Last 3 Encounters:   04/01/25 102/52   03/25/25 104/64   03/03/25 120/70       Health Maintenance   Topic Date Due    DIABETIC EYE EXAM  Never done    ZOSTER VACCINE (1 of 2) Never done    COVID-19 Vaccine (7 - 2024-25 season) 04/28/2025    INFLUENZA VACCINE  07/01/2025    HEMOGLOBIN A1C  07/23/2025    ANNUAL WELLNESS VISIT  09/24/2025    DIABETIC FOOT EXAM  09/24/2025    LIPID PANEL  01/23/2026    URINE MICROALBUMIN-CREATININE RATIO (uACR)  02/21/2026    COLORECTAL CANCER SCREENING  08/17/2026    TDAP/TD VACCINES (3 - Td or Tdap) 03/03/2035    HEPATITIS C SCREENING  Completed    Pneumococcal Vaccine 50+  Completed    AAA SCREEN ONCE  Completed       Physical Exam  Vitals reviewed.   Constitutional:       General: He is not in acute distress.     Appearance: Normal appearance. He is well-developed.   HENT:      Head: Normocephalic and atraumatic.      Right Ear: External ear normal.      Left Ear: External ear normal.   Eyes:      Conjunctiva/sclera: Conjunctivae normal.      Pupils: Pupils are equal, round, and reactive to light.   Cardiovascular:      Rate and Rhythm: Normal rate and regular rhythm.      Heart sounds: Normal heart sounds.   Pulmonary:      Effort: Pulmonary effort is normal.      Breath sounds: Normal breath sounds.   Musculoskeletal:      Cervical back: Neck supple.      Right lower leg: No edema.      Left lower leg: No edema.   Skin:     General: Skin is warm and dry.   Neurological:      Mental Status: He is alert and oriented to person, place, and time.   Psychiatric:    "      Mood and Affect: Mood and affect normal.         Behavior: Behavior normal.         Thought Content: Thought content normal.         Judgment: Judgment normal.            Result Review :  The following data was reviewed by: MARIANO Irving on 04/01/2025:  Results  Laboratory Studies  OLNNIE was positive. Vitamin B12 was 157. Thyroid level was normal. Magnesium level was normal. Primidone level was at 2.7.    Imaging  MRI showed a spot on the brain.     Common labs          1/23/2025    10:01 2/21/2025    10:46 3/25/2025    11:43 3/25/2025    12:46   Common Labs   Glucose 163   173     BUN 10   8     Creatinine 0.76   0.82  0.80    Sodium 139   134     Potassium 4.2   4.2     Chloride 102   97     Calcium 9.6   10.0     Albumin 4.3   4.2     Total Bilirubin 0.7   0.5     Alkaline Phosphatase 109   121     AST (SGOT) 16   22     ALT (SGPT) 18   23     WBC  9.88  6.22     Hemoglobin  15.8  15.9     Hematocrit  45.1  46.5     Platelets  300  216     Total Cholesterol 127       Triglycerides 185       HDL Cholesterol 34       LDL Cholesterol  62       Hemoglobin A1C 7.60       Microalbumin, Urine <1.2  <1.2        TSH          9/24/2024    11:00 3/25/2025    11:43   TSH   TSH 2.020  1.500      Vitamin B12 & Folate (03/25/2025 11:43)     MRI Brain With & Without Contrast  Result Date: 3/25/2025  Impression: 1. Within the right temporal lobe there is a 9 mm cystic area with surrounding abnormal increased T2 signal. There is no an enhancement within this region. Findings could be related to old ischemic change. Neoplastic processes are felt to be less likely given the lack of enhancement. Repeat MRI of the brain before and after IV contrast could be performed in 3-6 months to document stability. 2. No evidence of acute infarct or hemorrhage. 3. Scattered foci of increased T2 signal within the periventricular white matter compatible with chronic small vessel ischemic disease. Electronically Signed: Cordell Garcia MD   3/25/2025 1:25 PM EDT  Workstation ID: [id number]    Procedures        Assessment and Plan   Diagnoses and all orders for this visit:    1. Vitamin B12 deficiency (Primary)  -     cyanocobalamin 1000 MCG/ML injection; Inject 1 mL into the appropriate muscle as directed by prescriber Daily for 5 days, THEN 1 mL Every 7 (Seven) Days for 28 days, THEN 1 mL Every 14 (Fourteen) Days for 28 days, THEN 1 mL Every 30 (Thirty) Days for 60 days.  Dispense: 3 mL; Refill: 1    2. Elevated antinuclear antibody (LONNIE) level  -     Ambulatory Referral to Rheumatology    3. Abnormal brain MRI    4. Tremor of right hand    5. Fatigue, unspecified type         1. Recent COVID-19 infection.  His recent recovery from COVID-19 could be contributing to his current state of fatigue.    2. Fatigue.  His vitamin B12 levels are significantly low at 157 pg/mL, which could be causing his fatigue. A prescription for B12 injections will be sent to the pharmacy. The initial injection will be administered in the office, and subsequent injections can be self-administered at home. The regimen will be one shot daily for 5 days, then one shot weekly for 4 weeks, followed by one shot every other week for two doses, and finally monthly injections.    3. Tremors.  His primidone level is slightly below the therapeutic range at 2.7 mcg/mL. He is advised to continue taking primidone and follow up with neurology and neurosurgery for further recommendations. Neurology may suggest adding propranolol if necessary.    4. Elevated LONNIE.  His LONNIE is elevated, which could be related to his brain lesion or indicative of an autoimmune condition. A referral to rheumatology is recommended to evaluate the joint pain and elevated LONNIE.     BMI is within normal parameters. No other follow-up for BMI required.         FOLLOW UP  No follow-ups on file.    Patient was given instructions and counseling regarding his condition or for health maintenance advice. Please see specific  information pulled into the AVS if appropriate.       MARIANO Irving  04/01/25  12:02 EDT    CURRENT & DISCONTINUED MEDICATIONS  Current Outpatient Medications   Medication Instructions    amoxicillin-clavulanate (AUGMENTIN) 875-125 MG per tablet 1 tablet, Oral, 2 Times Daily    aspirin 81 MG tablet Daily    atorvastatin (LIPITOR) 40 mg, Oral, Daily    cyanocobalamin 1000 MCG/ML injection Inject 1 mL into the appropriate muscle as directed by prescriber Daily for 5 days, THEN 1 mL Every 7 (Seven) Days for 28 days, THEN 1 mL Every 14 (Fourteen) Days for 28 days, THEN 1 mL Every 30 (Thirty) Days for 60 days.    meloxicam (MOBIC) 7.5 mg, Oral, Daily    primidone (MYSOLINE) 50 mg, Oral, 2 Times Daily    saccharomyces boulardii (FLORASTOR) 250 mg, Oral, 2 Times Daily       There are no discontinued medications.     Patient or patient representative verbalized consent for the use of Ambient Listening during the visit with  MARIANO Irving for chart documentation. 4/1/2025  10:10 EDT

## 2025-04-04 ENCOUNTER — TELEPHONE (OUTPATIENT)
Dept: FAMILY MEDICINE CLINIC | Facility: CLINIC | Age: 75
End: 2025-04-04

## 2025-04-04 NOTE — TELEPHONE ENCOUNTER
Caller: Twin Babcock    Relationship to patient: Self      Best call back number:   Telephone Information:   Mobile [phone]         Provider: SHOAIB SUTTON     Medication PA needed: B-12    Reason for call/Prior Auth:      THE PATIENT SAID THE PHARMACY TOLD HIM HIS MEDICATION HAS TO BE PRE-APPROVED

## 2025-04-14 ENCOUNTER — TELEPHONE (OUTPATIENT)
Dept: NEUROLOGY | Facility: CLINIC | Age: 75
End: 2025-04-14

## 2025-04-14 ENCOUNTER — OFFICE VISIT (OUTPATIENT)
Dept: NEUROLOGY | Facility: CLINIC | Age: 75
End: 2025-04-14
Payer: MEDICARE

## 2025-04-14 VITALS
BODY MASS INDEX: 23.34 KG/M2 | HEART RATE: 73 BPM | HEIGHT: 70 IN | DIASTOLIC BLOOD PRESSURE: 56 MMHG | WEIGHT: 163 LBS | OXYGEN SATURATION: 95 % | SYSTOLIC BLOOD PRESSURE: 118 MMHG

## 2025-04-14 DIAGNOSIS — E53.8 B12 DEFICIENCY: ICD-10-CM

## 2025-04-14 DIAGNOSIS — R42 DIZZINESS: ICD-10-CM

## 2025-04-14 DIAGNOSIS — R90.89 ABNORMAL FINDING ON MRI OF BRAIN: Primary | ICD-10-CM

## 2025-04-14 RX ORDER — LANOLIN ALCOHOL/MO/W.PET/CERES
1000 CREAM (GRAM) TOPICAL DAILY
COMMUNITY

## 2025-04-14 NOTE — PROGRESS NOTES
DOS: 2025  NAME: Twin Babcock   : 1950  PCP: Debbie Christopher APRN    Chief Complaint   Patient presents with    Dizziness      SUBJECTIVE  Neurological Problem:  75 y.o. male with a past medical history of, MI, hyperlipidemia, DM 2, right hand tremor. Patient and problem are new to examiner. History is provided by patient and review of records that are summarized below.  He is unaccompanied.    HPI:  I have had the pleasure of seeing your patient today. As you know, Twin is being seen at the request of and referred to us by Dr. Raúl Manuel.     Mr. Babcock presents to us with an initial complaint of dizziness.  He reports that approximately 1 month ago he had the COVID-19 infection.  During this infection he was experiencing significant dizziness that he described as a spinning sensation.  He also said that he felt generally weak and fatigued with significant stomach upset.  He had a fall during this time.  Because of the fall and dizziness his PCP ordered a MRI brain for further evaluation.  He denied any focal symptoms, no double vision or vision loss, no slurred speech or difficulty swallowing, no facial droop, no unilateral extremity weakness or numbness.  He states that the dizziness has since resolved.  He denies any further falls, no hospitalizations.  MRI brain with and without contrast was overall unremarkable however on the T2 sequence within the right temporal lobe there was a 9 mm cystic area without enhancement.  Findings felt possibly related to old ischemia however neoplastic process though felt less likely given the lack of enhancement, was mentioned.  It was recommended he have a repeat MRI brain with and without contrast within 3 to 6 months for stability.  Chronic small vessel ischemic disease noted.  He acknowledges several head injuries in his past, states one was so significant he almost lost his life because of it.  He is a non-smoker, social drinker maybe once a  month.  He is physically active, likes to hike, kayak and occasionally rock climb.  Review of labs vitamin B12 was 157 at the end of March.  His PCP started him on B12 injection protocol however he states his insurance would not pay for it and so he picked up oral B12 supplements over-the-counter, is unsure the dose he takes.  He also acknowledges a tremor of his hands, says he was diagnosed with essential tremor and is on a medication that works well to control it.  Per review of chart it looks like he is taking primidone 50 mg twice daily.    Review of Systems   Musculoskeletal:  Negative for gait problem.   Neurological:  Positive for dizziness and tremors. Negative for seizures, syncope, facial asymmetry, speech difficulty, weakness, light-headedness, numbness and headaches.   Psychiatric/Behavioral:  Negative for agitation, behavioral problems, confusion, decreased concentration, dysphoric mood, hallucinations, self-injury, sleep disturbance and suicidal ideas. The patient is not nervous/anxious and is not hyperactive.         The following portions of the patient's history were reviewed and updated as appropriate: allergies, current medications, past family history, past medical history, past social history, past surgical history and problem list.    Current Medications:   Current Outpatient Medications:     aspirin 81 MG tablet, Take  by mouth Daily., Disp: , Rfl:     atorvastatin (LIPITOR) 40 MG tablet, TAKE 1 TABLET BY MOUTH DAILY, Disp: 90 tablet, Rfl: 1    meloxicam (Mobic) 7.5 MG tablet, Take 1 tablet by mouth Daily., Disp: 90 tablet, Rfl: 1    primidone (MYSOLINE) 50 MG tablet, Take 1 tablet by mouth 2 (Two) Times a Day., Disp: 180 tablet, Rfl: 1    vitamin B-12 (CYANOCOBALAMIN) 1000 MCG tablet, Take 1 tablet by mouth Daily., Disp: , Rfl:   **I did not stop or change the above medications. Patient's medication list was updated to reflect medications they have reported as currently taking, including  "medication changes made by other providers.    Objective   Vital Signs:  /56   Pulse 73   Ht 177.8 cm (70\")   Wt 73.9 kg (163 lb)   SpO2 95%   BMI 23.39 kg/m²   Body mass index is 23.39 kg/m².      Result Review :  The following data was reviewed by: MARIANO Fermin on 04/14/2025:    Laboratory Results:         Lab Results   Component Value Date    WBC 6.22 03/25/2025    HGB 15.9 03/25/2025    HCT 46.5 03/25/2025    MCV 90.3 03/25/2025     03/25/2025     Lab Results   Component Value Date    GLUCOSE 173 (H) 03/25/2025    BUN 8 03/25/2025    CREATININE 0.80 03/25/2025    EGFRIFNONA 92 08/02/2021    BCR 9.8 03/25/2025    K 4.2 03/25/2025    CO2 23.9 03/25/2025    CALCIUM 10.0 03/25/2025    ALBUMIN 4.2 03/25/2025    AST 22 03/25/2025    ALT 23 03/25/2025     Lab Results   Component Value Date    HGBA1C 7.60 (H) 01/23/2025     Lab Results   Component Value Date    CHOL 127 01/23/2025    CHOL 104 09/24/2024    CHOL 125 03/04/2024     Lab Results   Component Value Date    HDL 34 (L) 01/23/2025    HDL 28 (L) 09/24/2024    HDL 31 (L) 03/04/2024     Lab Results   Component Value Date    LDL 62 01/23/2025    LDL 48 09/24/2024    LDL 52 03/04/2024     Lab Results   Component Value Date    TRIG 185 (H) 01/23/2025    TRIG 166 (H) 09/24/2024    TRIG 267 (H) 03/04/2024     No results found for: \"RPR\"  Lab Results   Component Value Date    TSH 1.500 03/25/2025     Lab Results   Component Value Date    NOHSQCJB46 157 (L) 03/25/2025       Data reviewed : Radiologic studies      Physical Exam   Physical Exam:  GENERAL: NAD, alert  HEENT: Normocephalic, atraumatic   Resp: Even and unlabored  Extremities: No edema  Skin: Warm and dry  Psychiatric: Normal mood and affect    Neurological:   MS: AOx3, recent/remote memory intact, normal attention/concentration, language intact, no neglect  CN:  OD grossly normal, OS baseline blind, PERRL, EOMI, no nystagmus, no facial droop, no dysarthria, tongue midline,bilateral " shoulder shrug symmetric  Motor: Normal tone and bulk. No abnormal movements noted. No asterixis. No overshoot. Postural tremor bilateral hands/arms. Action tremor right hand.   Deltoid: 5/5 right; 5/5 left  Biceps: 5/5 right; 5/5 left  Triceps: 5/5 right; 5/5 left  Left  2+  Right  2+  Iliopsoas: 5/5 right; 5/5 left  Quadriceps: 5/5 right; 5/5 left  Hamstrin/5 right; 5/5 left  Tibialis interior: 5/5 right; 5/5 left  Toe extensors: 5/5 LLE, 5/5 RLE  Toe flexors: 5/5 LLE, 5/5 RLE  Sensory: Intact to crude and light touch in all four ext.  Coordination: No dysmetria finger to nose   Gait and station: Normal gait and station         Assessment and Plan   Diagnoses and all orders for this visit:    1. Abnormal finding on MRI of brain (Primary)  -     MRI Brain With & Without Contrast; Future  -     Ambulatory Referral to Neurology    2. Dizziness  -     Ambulatory Referral to Neurology    3. B12 deficiency      Dizziness has resolved.  Feel that it was likely related to COVID-19 infection.  Abnormal finding on MRI felt most likely to be a cyst.  We will repeat MRI brain in 6 months to monitor stability.  He tells me that he is relocating to [facility] [place] the [place] sometime in May.  A referral to neurology has been placed for when he becomes established to maintain follow-up for the abnormality on MRI brain.  Discussed that he should go to the ER for further evaluation for strokelike symptoms.  Given that his insurance company would not cover the vitamin B12 injections, I would like him to take 1000 mcg oral daily.  Offered to provide injections here in the clinic but he does not want to make the drive weekly to have this done.    We will see Twin back in 6 months, sooner if symptoms warrant.     MARIANO Fermin  [facility]   25       I spent 45 minutes caring for Twin on this date of service. This time includes time spent by me in the following  activities:preparing for the visit, reviewing tests, obtaining and/or reviewing a separately obtained history, performing a medically appropriate examination and/or evaluation , counseling and educating the patient/family/caregiver, ordering medications, tests, or procedures, referring and communicating with other health care professionals , documenting information in the medical record, independently interpreting results and communicating that information with the patient/family/caregiver, and care coordination  Follow Up   No follow-ups on file.  Patient was given instructions and counseling regarding his condition or for health maintenance advice. Please see specific information pulled into the AVS if appropriate.       Dictated using Dragon Dictation    As of April 2021, as required by the Federal [employer] Act, medical records (including provider notes and laboratory/imaging results) are to be made available to patient’s and/or their designees as soon as the documents are signed/resulted. While the intention is to ensure transparency and to engage the patient in their healthcare, this immediate access may create unintended consequences as this document uses language intended for communication between medical experts and diagnostic results are interpreted with the entirety of the patient’s clinical picture in mind. It is recommended that patients and/or their designees review all available information with their primary or specialist providers for explanation and guidance to avoid misinterpretation based on layperson understanding, non-medical expert opinions, or Internet searches.

## 2025-04-14 NOTE — TELEPHONE ENCOUNTER
Caller: Twin Babcock    Relationship: Self    Best call back number: [phone]    Who are you requesting to speak with (clinical staff, provider,  specific staff member): PT UNSURE    Do you know the name of the person who called: N/A    What was the call regarding: PT ATTEMPTING TO RETURN PHONE CALL TO OFFICE REGARDING MOVING HIS APPT W/ MARIANO TANG TODAY FROM 4PM TO 3PM. PT CONFIRMS HE CAN COME AT 3PM TODAY.     [facility] UNABLE TO MOVE APPT FROM 4PM TO 3PM TODAY. FELICIA, IN OFFICE, ADVISED TO SEND T/E FOR OFFICE TO UPDATE APPT FOR PT.    PLEASE REVIEW AND ADVISE.

## 2025-04-15 ENCOUNTER — PATIENT ROUNDING (BHMG ONLY) (OUTPATIENT)
Dept: NEUROLOGY | Facility: CLINIC | Age: 75
End: 2025-04-15
Payer: MEDICARE

## 2025-04-30 ENCOUNTER — TELEPHONE (OUTPATIENT)
Dept: FAMILY MEDICINE CLINIC | Facility: CLINIC | Age: 75
End: 2025-04-30
Payer: MEDICARE

## 2025-04-30 NOTE — TELEPHONE ENCOUNTER
Caller: Twin Babcock    Relationship to patient: Self    Best call back number: [phone]    Patient is needing: PATIENT CALLED REQUESTING TO SPEAK WITH CLINICAL STAFF. PATIENT STATES HE HAS SOME QUESTIONS REGARDING HIS RECENT REFERRAL FOR RHEUMATOLOGY THAT WAS PLACED. PATIENT STATES THE PROVIDER HE WAS REFERRED TO CAN NOT GET HIM WORKED IN UNTIL JULY AND HIS FAMILY WILL BE MOVING AND HE IS WANTING HOW IMPORTANT THIS REFERRAL ACTUALLY IS. PATIENT STATES HE IS ALSO WANTING TO KNOW IF HIM BEING COVID POSITIVE AT THE TIME OF THIS REFERRAL COULD BE THE REASON HIS LONNIE LEVELS WERE HIGH.

## 2025-05-22 ENCOUNTER — HOSPITAL ENCOUNTER (OUTPATIENT)
Dept: CARDIOLOGY | Facility: HOSPITAL | Age: 75
Discharge: HOME | End: 2025-05-22
Admitting: FAMILY MEDICINE
Payer: MEDICARE

## 2025-05-22 DIAGNOSIS — R42 DIZZINESS: ICD-10-CM

## 2025-05-22 LAB
BH CV XLRA MEAS LEFT CAROTID BULB EDV: 11.7 CM/SEC
BH CV XLRA MEAS LEFT CAROTID BULB PSV: 35.5 CM/SEC
BH CV XLRA MEAS LEFT DIST CCA EDV: 8.7 CM/SEC
BH CV XLRA MEAS LEFT DIST CCA PSV: 56.3 CM/SEC
BH CV XLRA MEAS LEFT DIST ICA EDV: 25.5 CM/SEC
BH CV XLRA MEAS LEFT DIST ICA PSV: 87.4 CM/SEC
BH CV XLRA MEAS LEFT ICA/CCA RATIO: 0.85
BH CV XLRA MEAS LEFT MID ICA EDV: 24.7 CM/SEC
BH CV XLRA MEAS LEFT MID ICA PSV: 94.8 CM/SEC
BH CV XLRA MEAS LEFT PROX CCA EDV: 10 CM/SEC
BH CV XLRA MEAS LEFT PROX CCA PSV: 72.5 CM/SEC
BH CV XLRA MEAS LEFT PROX ECA EDV: 10.8 CM/SEC
BH CV XLRA MEAS LEFT PROX ECA PSV: 93 CM/SEC
BH CV XLRA MEAS LEFT PROX ICA EDV: 12.6 CM/SEC
BH CV XLRA MEAS LEFT PROX ICA PSV: 48 CM/SEC
BH CV XLRA MEAS LEFT VERTEBRAL A EDV: 7.4 CM/SEC
BH CV XLRA MEAS LEFT VERTEBRAL A PSV: 28.6 CM/SEC
BH CV XLRA MEAS RIGHT CAROTID BULB EDV: 10.5 CM/SEC
BH CV XLRA MEAS RIGHT CAROTID BULB PSV: 47.3 CM/SEC
BH CV XLRA MEAS RIGHT DIST CCA EDV: 11.2 CM/SEC
BH CV XLRA MEAS RIGHT DIST CCA PSV: 49.5 CM/SEC
BH CV XLRA MEAS RIGHT DIST ICA EDV: 22.6 CM/SEC
BH CV XLRA MEAS RIGHT DIST ICA PSV: 77.4 CM/SEC
BH CV XLRA MEAS RIGHT ICA/CCA RATIO: 1.09
BH CV XLRA MEAS RIGHT MID ICA EDV: 25.8 CM/SEC
BH CV XLRA MEAS RIGHT MID ICA PSV: 75.8 CM/SEC
BH CV XLRA MEAS RIGHT PROX CCA EDV: 10.6 CM/SEC
BH CV XLRA MEAS RIGHT PROX CCA PSV: 67.7 CM/SEC
BH CV XLRA MEAS RIGHT PROX ECA EDV: 5.9 CM/SEC
BH CV XLRA MEAS RIGHT PROX ECA PSV: 97 CM/SEC
BH CV XLRA MEAS RIGHT PROX ICA EDV: 14.9 CM/SEC
BH CV XLRA MEAS RIGHT PROX ICA PSV: 54.2 CM/SEC
BH CV XLRA MEAS RIGHT VERTEBRAL A EDV: 9 CM/SEC
BH CV XLRA MEAS RIGHT VERTEBRAL A PSV: 36.2 CM/SEC
LEFT ARM BP: NORMAL MMHG
RIGHT ARM BP: NORMAL MMHG

## 2025-05-22 PROCEDURE — 93880 EXTRACRANIAL BILAT STUDY: CPT

## 2025-05-23 NOTE — PROGRESS NOTES
Carotid ultrasound showed some mild insignificant blockages.  For this just watch diet and exercise.  Nothing medical needs to be done at this time and this will not cause any symptoms.

## 2025-06-17 DIAGNOSIS — E78.5 HYPERLIPIDEMIA, UNSPECIFIED HYPERLIPIDEMIA TYPE: ICD-10-CM

## 2025-06-17 RX ORDER — ATORVASTATIN CALCIUM 40 MG/1
40 TABLET, FILM COATED ORAL DAILY
Qty: 90 TABLET | Refills: 1 | OUTPATIENT
Start: 2025-06-17

## 2025-08-01 DIAGNOSIS — M25.50 POLYARTHRALGIA: ICD-10-CM

## 2025-08-01 DIAGNOSIS — E11.9 TYPE 2 DIABETES MELLITUS WITHOUT COMPLICATION, WITHOUT LONG-TERM CURRENT USE OF INSULIN: ICD-10-CM

## 2025-08-01 DIAGNOSIS — R53.83 FATIGUE, UNSPECIFIED TYPE: ICD-10-CM

## 2025-08-01 DIAGNOSIS — E53.8 VITAMIN B12 DEFICIENCY: Primary | ICD-10-CM

## 2025-08-01 DIAGNOSIS — M89.9 DISORDER OF BONE, UNSPECIFIED: ICD-10-CM

## 2025-08-01 DIAGNOSIS — I25.10 CORONARY ARTERY DISEASE INVOLVING NATIVE CORONARY ARTERY OF NATIVE HEART WITHOUT ANGINA PECTORIS: ICD-10-CM

## 2025-08-01 DIAGNOSIS — E78.5 HYPERLIPIDEMIA, UNSPECIFIED HYPERLIPIDEMIA TYPE: ICD-10-CM

## (undated) DEVICE — COLON KIT: Brand: MEDLINE INDUSTRIES, INC.

## (undated) DEVICE — Device: Brand: DEFENDO AIR/WATER/SUCTION AND BIOPSY VALVE

## (undated) DEVICE — SOL IRRG H2O PL/BG 1000ML STRL